# Patient Record
Sex: FEMALE | Race: BLACK OR AFRICAN AMERICAN | Employment: OTHER | URBAN - METROPOLITAN AREA
[De-identification: names, ages, dates, MRNs, and addresses within clinical notes are randomized per-mention and may not be internally consistent; named-entity substitution may affect disease eponyms.]

---

## 2017-04-25 ENCOUNTER — HOSPITAL ENCOUNTER (EMERGENCY)
Age: 72
Discharge: HOME | End: 2017-04-25
Attending: EMERGENCY MEDICINE
Payer: MEDICARE

## 2017-04-25 VITALS
TEMPERATURE: 99.3 F | BODY MASS INDEX: 22.05 KG/M2 | HEART RATE: 66 BPM | WEIGHT: 145 LBS | SYSTOLIC BLOOD PRESSURE: 144 MMHG | DIASTOLIC BLOOD PRESSURE: 64 MMHG | OXYGEN SATURATION: 98 % | RESPIRATION RATE: 16 BRPM

## 2017-04-25 DIAGNOSIS — M79.605 BILATERAL LEG PAIN: Primary | ICD-10-CM

## 2017-04-25 DIAGNOSIS — M79.604 BILATERAL LEG PAIN: Primary | ICD-10-CM

## 2017-04-25 LAB — GLUCOSE BLD STRIP.AUTO-MCNC: 111 MG/DL (ref 70–110)

## 2017-04-25 PROCEDURE — 82962 GLUCOSE BLOOD TEST: CPT

## 2017-04-25 PROCEDURE — 99283 EMERGENCY DEPT VISIT LOW MDM: CPT

## 2017-04-26 NOTE — ED NOTES
Pt states she has been having BLE pain for the past three years, L>R, has been taking Tylenol but no relief. Denies any recent injury, uses walker at home. Bilateral pedal pulses present. Able to move toes. CRT<2sec. Family at bedside.

## 2017-04-26 NOTE — ED NOTES
I have reviewed discharge instructions with the patient. The patient verbalized understanding. Pt in stable condition left via wheelchair, accompanied by hospital staff and daughter.

## 2017-04-26 NOTE — ANCILLARY DISCHARGE INSTRUCTIONS
Paco Lu calling on behalf of her mother. She wants to get her into [facility]. She states that her mother was in [facility] for 3 days. Her mother cannot move without screaming and she works and is concerned for her safety. She has applied for Medicaid back in November but hasn't heard from them. She also applied for someone to come out to the home([facility])received approval letter but hasnt heard anything. She is looking for direction. Explained that she is doing the right things she just needs to make the appropriate follow up calls.

## 2017-04-26 NOTE — ED TRIAGE NOTES
\"I cant walk. Im having problems with my legs. \"  Pt states this has been going on for the past year.

## 2017-04-26 NOTE — ED PROVIDER NOTES
Patient is a 66 y.o. female presenting with leg pain. The history is provided by the patient and a relative. Leg Pain      Nanette Ray is a 66 y.o. female presents with c/o bilateral leg pain. Pt has had leg pain for over a year. Was in [facility] ED for the past three days. Was released this morning. Daughter was unaware of the conversation at [facility]. Review of the notes shows that Assisted Living was an option but the Pt does not have insurance that will pay for assisted living. Home Health was arranged. Daughter wants her to be in assisted living. Denies any changes in leg pain and weakness. Daughter brought her mother here hoping that we can admit her and arrange for assisted living. Past Medical History:   Diagnosis Date    Anemia [facility]     CAD (coronary artery disease)     Congestive heart failure, unspecified     Diabetes ([facility] [address]     Dyslipidemia     GERD (gastroesophageal reflux disease)     Glaucoma     HTN (hypertension)     Kidney failure     LVH (left ventricular hypertrophy)     Neuropathy        Past Surgical History:   Procedure Laterality Date    HX BREAST BIOPSY  03/24/2010    excision left breast biopsy    HX HYSTERECTOMY      HX OTHER SURGICAL  12/3/10    I&D w/Pulsavac & wound vac application, left heel    HX OTHER SURGICAL  12/6/10    Debridement, left heel w/reduction of inferior calcaneal spur, left heel    HX POLYPECTOMY           Family History:   Problem Relation Age of Onset    Hypertension Other     Heart Disease Other     Diabetes Other     Diabetes Other     Heart Disease Other     Hypertension Other        Social History     Social History    Marital status:      Spouse name: N/A    Number of children: N/A    Years of education: N/A     Occupational History    Not on file.      Social History Main Topics    Smoking status: Never Smoker    Smokeless tobacco: Never Used    Alcohol use No    Drug use: No    Sexual activity: Not on file Other Topics Concern    Not on file     Social History Narrative         ALLERGIES: Review of patient's allergies indicates no known allergies. Review of Systems  Constitutional:  Denies malaise, fever, chills. Head:  Denies injury. Face:  Denies injury or pain. ENMT:  Denies sore throat. Neck:  Denies injury or pain. Chest:  Denies injury. Cardiac:  Denies chest pain or palpitations. Respiratory:  Denies cough, wheezing, difficulty breathing, shortness of breath. GI/ABD:  Denies injury, pain, distention, nausea, vomiting, diarrhea. :  Denies injury, pain, dysuria or urgency. Back:  Denies injury or pain. Pelvis:  Denies injury or pain. Extremity/MS:  Leg pain weakness. Neuro:  Denies headache, LOC, dizziness, neurologic symptoms/deficits/paresthesias. Skin: Denies injury, rash, itching or skin changes. Vitals:    04/25/17 2115   BP: 145/68   Pulse: 66   Resp: 16   Temp: 99.3 °F (37.4 °C)   SpO2: 99%   Weight: 65.8 kg (145 lb)            Physical Exam   Nursing note and vitals reviewed. CONSTITUTIONAL: Alert, in no apparent distress; well-developed; Frail  HEAD:  Normocephalic, atraumatic. EYES: PERRL; EOM's intact. ENTM: Nose: No rhinorrhea; Throat: mucous membranes moist. Posterior pharynx-normal.  Neck:  No JVD, supple without lymphadenopathy. RESP: Chest clear, equal breath sounds. CV: S1 and S2 WNL; No murmurs, gallops or rubs. GI: Abdomen soft and non-tender. No masses or organomegaly. UPPER EXT:  Normal inspection. LOWER EXT: Bilateral legs with 1+pitting edema, generalized tenderness, 4/5 strength, good pedal pulse, <2 cap refill. NEURO: strength 5/5 and sym, sensation intact. SKIN: No rashes; Normal for age and stage. PSYCH:  Alert and oriented, normal affect.        MDM  Number of Diagnoses or Management Options  Bilateral leg pain:   Diagnosis management comments: IMPRESSION AND MEDICAL DECISION MAKING:  Based upon the patient's presentation with noted HPI and PE, along with the work up done in the emergency department, I believe that the patient has chronic leg pain and weakness from a previous stroke. Had long discussion with Pt and daughter. Told daughter that assisted living from [facility] was going to be arranged but since Pt does not have insurance that the family declined. Home Health has been arranged. Will give  number and have them contact them tomorrow. Will also have her contact her PCP for possible suggestions. Amount and/or Complexity of Data Reviewed  Clinical lab tests: ordered and reviewed  Review and summarize past medical records: yes      ED Course       Procedures    Vitals:  Patient Vitals for the past 12 hrs:   Temp Pulse Resp BP SpO2   04/25/17 2115 99.3 °F (37.4 °C) 66 16 145/68 99 %         Medications ordered:   Medications - No data to display      Lab findings:  Recent Results (from the past 12 hour(s))   GLUCOSE, POC    Collection Time: 04/25/17  9:57 PM   Result Value Ref Range    Glucose (POC) 111 (H) 70 - 110 mg/dL       EKG interpretation by ED Physician:      X-Ray, CT or other radiology findings or impressions:  No orders to display       Progress notes, Consult notes or additional Procedure notes:       Disposition:  Diagnosis: No diagnosis found. Disposition:   10:02 PM  Pt reevaluated at this time and is resting comfortably in NAD. Discussed results and findings, as well as, diagnosis and plan for discharge. Pt verbalizes understanding and agreement with plan. All questions addressed at this time. Follow-up Information     None           Patient's Medications   Start Taking    No medications on file   Continue Taking    BRIMONIDINE-TIMOLOL (COMBIGAN) 0.2-0.5 % DROP OPHTHALMIC SOLUTION    1 Drop every twelve (12) hours. BRINZOLAMIDE (AZOPT) 1 % OPHTHALMIC SUSPENSION    Administer 1 Drop to both eyes two (2) times a day.       FOLIC ACID/VITAMIN B COMP W-C (RENAL CAPS PO)    Take 1 Cap by mouth daily. GABAPENTIN (NEURONTIN) 400 MG CAPSULE    Take 400 mg by mouth. Take 400 mg every morning and 800 mg at bedtime    LISINOPRIL (PRINIVIL, ZESTRIL) 20 MG TABLET    Take 20 mg by mouth daily. ROSUVASTATIN (CRESTOR) 10 MG TABLET    Take 10 mg by mouth daily. SEVELAMER CARBONATE (RENVELA) 800 MG TAB TAB    Take 800 mg by mouth three (3) times daily.    These Medications have changed    No medications on file   Stop Taking    No medications on file

## 2017-05-14 ENCOUNTER — APPOINTMENT (OUTPATIENT)
Dept: GENERAL RADIOLOGY | Age: 72
DRG: 064 | End: 2017-05-14
Attending: PHYSICIAN ASSISTANT
Payer: MEDICARE

## 2017-05-14 ENCOUNTER — HOSPITAL ENCOUNTER (INPATIENT)
Age: 72
LOS: 5 days | Discharge: NURSING, REHAB OR LONG-TERM CARE | DRG: 064 | End: 2017-05-20
Attending: EMERGENCY MEDICINE | Admitting: FAMILY MEDICINE
Payer: MEDICARE

## 2017-05-14 ENCOUNTER — APPOINTMENT (OUTPATIENT)
Dept: CT IMAGING | Age: 72
DRG: 064 | End: 2017-05-14
Attending: PHYSICIAN ASSISTANT
Payer: MEDICARE

## 2017-05-14 DIAGNOSIS — G45.9 TRANSIENT CEREBRAL ISCHEMIA, UNSPECIFIED TYPE: Primary | ICD-10-CM

## 2017-05-14 LAB
ALBUMIN SERPL BCP-MCNC: 3.4 G/DL (ref 3.4–5)
ALBUMIN/GLOB SERPL: 0.9 {RATIO} (ref 0.8–1.7)
ALP SERPL-CCNC: 141 U/L (ref 45–117)
ALT SERPL-CCNC: 15 U/L (ref 13–56)
ANION GAP BLD CALC-SCNC: 10 MMOL/L (ref 3–18)
ASPIRIN TEST, ASPIRN: 634 ARU
AST SERPL W P-5'-P-CCNC: 26 U/L (ref 15–37)
BILIRUB SERPL-MCNC: 0.7 MG/DL (ref 0.2–1)
BUN SERPL-MCNC: 27 MG/DL (ref 7–18)
BUN/CREAT SERPL: 4 (ref 12–20)
CALCIUM SERPL-MCNC: 8.9 MG/DL (ref 8.5–10.1)
CHLORIDE SERPL-SCNC: 101 MMOL/L (ref 100–108)
CK MB CFR SERPL CALC: 2.1 % (ref 0–4)
CK MB SERPL-MCNC: 1.2 NG/ML (ref 5–25)
CK SERPL-CCNC: 56 U/L (ref 26–192)
CO2 SERPL-SCNC: 25 MMOL/L (ref 21–32)
CREAT SERPL-MCNC: 6.62 MG/DL (ref 0.6–1.3)
ERYTHROCYTE [DISTWIDTH] IN BLOOD BY AUTOMATED COUNT: 17.6 % (ref 11.6–14.5)
FIBRINOGEN PPP-MCNC: 435 MG/DL (ref 210–451)
GLOBULIN SER CALC-MCNC: 3.9 G/DL (ref 2–4)
GLUCOSE SERPL-MCNC: 126 MG/DL (ref 74–99)
HCT VFR BLD AUTO: 44 % (ref 35–45)
HGB BLD-MCNC: 14.5 G/DL (ref 12–16)
INR PPP: 1 (ref 0.8–1.2)
MCH RBC QN AUTO: 27.2 PG (ref 24–34)
MCHC RBC AUTO-ENTMCNC: 33 G/DL (ref 31–37)
MCV RBC AUTO: 82.6 FL (ref 74–97)
P2Y12 PLT RESPONSE,PPPR: 227 PRU (ref 194–418)
PLATELET # BLD AUTO: 150 K/UL (ref 135–420)
PMV BLD AUTO: 10.9 FL (ref 9.2–11.8)
POTASSIUM SERPL-SCNC: 5.4 MMOL/L (ref 3.5–5.5)
PROT SERPL-MCNC: 7.3 G/DL (ref 6.4–8.2)
PROTHROMBIN TIME: 12.7 SEC (ref 11.5–15.2)
RBC # BLD AUTO: 5.33 M/UL (ref 4.2–5.3)
SODIUM SERPL-SCNC: 136 MMOL/L (ref 136–145)
THROMBIN TIME: 17.8 SECS (ref 13.8–18.2)
TROPONIN I SERPL-MCNC: 0.02 NG/ML (ref 0–0.04)
WBC # BLD AUTO: 4.9 K/UL (ref 4.6–13.2)

## 2017-05-14 PROCEDURE — 85384 FIBRINOGEN ACTIVITY: CPT | Performed by: PHYSICIAN ASSISTANT

## 2017-05-14 PROCEDURE — 85610 PROTHROMBIN TIME: CPT | Performed by: PHYSICIAN ASSISTANT

## 2017-05-14 PROCEDURE — 82553 CREATINE MB FRACTION: CPT | Performed by: PHYSICIAN ASSISTANT

## 2017-05-14 PROCEDURE — 85027 COMPLETE CBC AUTOMATED: CPT | Performed by: PHYSICIAN ASSISTANT

## 2017-05-14 PROCEDURE — 71010 XR CHEST PORT: CPT

## 2017-05-14 PROCEDURE — 99285 EMERGENCY DEPT VISIT HI MDM: CPT

## 2017-05-14 PROCEDURE — 85576 BLOOD PLATELET AGGREGATION: CPT | Performed by: PHYSICIAN ASSISTANT

## 2017-05-14 PROCEDURE — 70450 CT HEAD/BRAIN W/O DYE: CPT

## 2017-05-14 PROCEDURE — 94762 N-INVAS EAR/PLS OXIMTRY CONT: CPT

## 2017-05-14 PROCEDURE — 85670 THROMBIN TIME PLASMA: CPT | Performed by: PHYSICIAN ASSISTANT

## 2017-05-14 PROCEDURE — 80053 COMPREHEN METABOLIC PANEL: CPT | Performed by: PHYSICIAN ASSISTANT

## 2017-05-14 PROCEDURE — 86900 BLOOD TYPING SEROLOGIC ABO: CPT | Performed by: PHYSICIAN ASSISTANT

## 2017-05-14 NOTE — IP AVS SNAPSHOT
Current Discharge Medication List  
  
START taking these medications Dose & Instructions Dispensing Information Comments Morning Noon Evening Bedtime  
 aspirin 325 mg tablet Commonly known as:  ASPIRIN Your last dose was: Your next dose is:    
   
   
 Dose:  325 mg Take 1 Tab by mouth daily. Quantity:  100 Tab Refills:  5  
     
   
   
   
  
 atorvastatin 80 mg tablet Commonly known as:  LIPITOR Your last dose was: Your next dose is:    
   
   
 Dose:  80 mg Take 1 Tab by mouth nightly. Quantity:  30 Tab Refills:  5  
     
   
   
   
  
 b complex-vitamin c-folic acid 0.8 mg 0.8 mg Tab tablet Commonly known as:  Dadarrion Gomezh Your last dose was: Your next dose is:    
   
   
 Dose:  1 Tab Take 1 Tab by mouth daily. Quantity:  30 Tab Refills:  5  
     
   
   
   
  
 lidocaine 5 % Commonly known as:  Annamary Manual Your last dose was: Your next dose is:    
   
   
 Apply patch to the affected area for 12 hours a day and remove for 12 hours a day. Quantity:  1 Each Refills:  0  
     
   
   
   
  
 senna-docusate 8.6-50 mg per tablet Commonly known as:  Areatha Dach Your last dose was: Your next dose is:    
   
   
 Dose:  2 Tab Take 2 Tabs by mouth nightly. Quantity:  60 Tab Refills:  5 CONTINUE these medications which have NOT CHANGED Dose & Instructions Dispensing Information Comments Morning Noon Evening Bedtime COMBIGAN 0.2-0.5 % Drop ophthalmic solution Generic drug:  brimonidine-timolol Your last dose was: Your next dose is:    
   
   
 Dose:  1 Drop 1 Drop every twelve (12) hours. Refills:  0  
     
   
   
   
  
 gabapentin 400 mg capsule Commonly known as:  NEURONTIN Your last dose was:     
   
Your next dose is:    
   
   
 Dose:  400 mg  
 Take 400 mg by mouth. Take 400 mg every morning and 800 mg at bedtime Refills:  0 RENAL CAPS PO Your last dose was: Your next dose is:    
   
   
 Dose:  1 Cap Take 1 Cap by mouth daily. Refills:  0  
     
   
   
   
  
 RENVELA 800 mg Tab tab Generic drug:  sevelamer carbonate Your last dose was: Your next dose is:    
   
   
 Dose:  800 mg Take 800 mg by mouth three (3) times daily. Refills:  0 STOP taking these medications AZOPT 1 % ophthalmic suspension Generic drug:  brinzolamide CRESTOR 10 mg tablet Generic drug:  rosuvastatin  
   
  
 lisinopril 20 mg tablet Commonly known as:  Mariana Gear Where to Get Your Medications Information on where to get these meds will be given to you by the nurse or doctor. ! Ask your nurse or doctor about these medications  
  aspirin 325 mg tablet  
 atorvastatin 80 mg tablet  
 b complex-vitamin c-folic acid 0.8 mg 0.8 mg Tab tablet  
 lidocaine 5 %  
 senna-docusate 8.6-50 mg per tablet

## 2017-05-14 NOTE — IP AVS SNAPSHOT
Belkis Dickson 
 
 
 [address] [place] [address] 
[phone] Patient: Aicha Emerson MRN: [id number] GEY:0[birth year]2/[birth year]73 You are allergic to the following No active allergies Recent Documentation Weight BMI OB Status Smoking Status 59.5 kg 20.55 kg/m2 Hysterectomy Never Smoker Emergency Contacts Name Discharge Info Relation Home Work Mobile Eboni Dietrich  Daughter [21] [phone] About your hospitalization You were admitted on:  May 15, 2017 You last received care in the:  [address] [facility] You were discharged on:  May 20, 2017 Unit phone number:  [phone] Why you were hospitalized Your primary diagnosis was:  Acute Ischemic Stroke (Hcc) Your diagnoses also included:  Encephalopathy Providers Seen During Your Hospitalizations Provider Role Specialty Primary office phone Luis M Duarte MD Attending Provider Emergency Medicine [phone] Abby Spivey MD Attending Provider [facility] [phone] Marco A Morales MD Attending Provider Internal Medicine [phone] Your Primary Care Physician (PCP) Primary Care Physician Office Phone Office Fax Shereen Newell [phone] Follow-up Information Follow up With Details Comments Contact Info [facility]  for rehab [address] [place] [address] [phone] Yadi Mitchell MD Schedule an appointment as soon as possible for a visit in 1 week for follow up one week after discharge from [address] [place] [address] 
[phone] Current Discharge Medication List  
  
START taking these medications Dose & Instructions Dispensing Information Comments Morning Noon Evening Bedtime  
 aspirin 325 mg tablet Commonly known as:  ASPIRIN Your last dose was: Your next dose is:    
   
   
 Dose:  325 mg Take 1 Tab by mouth daily. Quantity:  100 Tab Refills:  5  
     
   
   
   
  
 atorvastatin 80 mg tablet Commonly known as:  LIPITOR Your last dose was: Your next dose is:    
   
   
 Dose:  80 mg Take 1 Tab by mouth nightly. Quantity:  30 Tab Refills:  5  
     
   
   
   
  
 b complex-vitamin c-folic acid 0.8 mg 0.8 mg Tab tablet Commonly known as:  Marsen Jasbir Your last dose was: Your next dose is:    
   
   
 Dose:  1 Tab Take 1 Tab by mouth daily. Quantity:  30 Tab Refills:  5  
     
   
   
   
  
 lidocaine 5 % Commonly known as:  Eloise Wickred Your last dose was: Your next dose is:    
   
   
 Apply patch to the affected area for 12 hours a day and remove for 12 hours a day. Quantity:  1 Each Refills:  0  
     
   
   
   
  
 senna-docusate 8.6-50 mg per tablet Commonly known as:  Tosha Amador Your last dose was: Your next dose is:    
   
   
 Dose:  2 Tab Take 2 Tabs by mouth nightly. Quantity:  60 Tab Refills:  5 CONTINUE these medications which have NOT CHANGED Dose & Instructions Dispensing Information Comments Morning Noon Evening Bedtime COMBIGAN 0.2-0.5 % Drop ophthalmic solution Generic drug:  brimonidine-timolol Your last dose was: Your next dose is:    
   
   
 Dose:  1 Drop 1 Drop every twelve (12) hours. Refills:  0  
     
   
   
   
  
 gabapentin 400 mg capsule Commonly known as:  NEURONTIN Your last dose was: Your next dose is:    
   
   
 Dose:  400 mg Take 400 mg by mouth. Take 400 mg every morning and 800 mg at bedtime Refills:  0 RENAL CAPS PO Your last dose was: Your next dose is:    
   
   
 Dose:  1 Cap Take 1 Cap by mouth daily. Refills:  0  
     
   
   
   
  
 RENVELA 800 mg Tab tab Generic drug:  sevelamer carbonate Your last dose was: Your next dose is:    
   
   
 Dose:  800 mg Take 800 mg by mouth three (3) times daily. Refills:  0 STOP taking these medications AZOPT 1 % ophthalmic suspension Generic drug:  brinzolamide CRESTOR 10 mg tablet Generic drug:  rosuvastatin  
   
  
 lisinopril 20 mg tablet Commonly known as:  Bean  Where to Get Your Medications Information on where to get these meds will be given to you by the nurse or doctor. ! Ask your nurse or doctor about these medications  
  aspirin 325 mg tablet  
 atorvastatin 80 mg tablet  
 b complex-vitamin c-folic acid 0.8 mg 0.8 mg Tab tablet  
 lidocaine 5 %  
 senna-docusate 8.6-50 mg per tablet Discharge Instructions DISCHARGE SUMMARY from Nurse The following personal items are in your possession at time of discharge: 
 
Dental Appliances: With patient Visual Aid: None Home Medications: None Jewelry: None Clothing: Nicole Camps, Footwear Other Valuables: None PATIENT INSTRUCTIONS: 
 
 
F-face looks uneven A-arms unable to move or move unevenly S-speech slurred or non-existent T-time-call 911 as soon as signs and symptoms begin-DO NOT go Back to bed or wait to see if you get better-TIME IS BRAIN. Warning Signs of HEART ATTACK Call 911 if you have these symptoms: 
? Chest discomfort. Most heart attacks involve discomfort in the center of the chest that lasts more than a few minutes, or that goes away and comes back. It can feel like uncomfortable pressure, squeezing, fullness, or pain. ? Discomfort in other areas of the upper body. Symptoms can include pain or discomfort in one or both arms, the back, neck, jaw, or stomach. ? Shortness of breath with or without chest discomfort. ? Other signs may include breaking out in a cold sweat, nausea, or lightheadedness. Don't wait more than five minutes to call [address]! Fast action can save your life. Calling 911 is almost always the fastest way to get lifesaving treatment. Emergency Medical Services staff can begin treatment when they arrive  up to an hour sooner than if someone gets to the hospital by car. The discharge information has been reviewed with the patient and caregiver. The patient and caregiver verbalized understanding. Discharge medications reviewed with the patient and caregiver and appropriate educational materials and side effects teaching were provided. Learning About an Ischemic Stroke What is an ischemic stroke? An ischemic (say \"aek-EWN-ldld\") stroke occurs when a blood clot blocks a blood vessel in the brain. This means that blood cannot flow to some part of the brain. Without blood and the oxygen it carries, this part of the brain starts to die. The part of the body controlled by the damaged area of the brain cannot work properly. This is different from a hemorrhagic (say \"mzr-afu-EI-jick\") stroke, which happens when a blood vessel in the brain has burst open or has started to leak. The brain damage from a stroke starts within minutes. Quick treatment can help limit damage to the brain and make recovery more likely. People who have had a stroke may have a hard time talking, understanding things, and making decisions. They may have to relearn daily activities, such as how to eat, bathe, and dress. How well someone recovers from a stroke depends on how quickly the person gets to the hospital, where in the brain the stroke happened, and how severe it was.  Training and therapy also make a difference in how well people recover. What are the symptoms? If you have any of these symptoms, call 911 or other emergency services right away: 
· You have symptoms of a stroke. These may include: 
¨ Sudden numbness, tingling, weakness, or loss of movement in your face, arm, or leg, especially on only one side of your body. ¨ Sudden vision changes. ¨ Sudden trouble speaking. ¨ Sudden confusion or trouble understanding simple statements. ¨ Sudden problems with walking or balance. ¨ A sudden, severe headache that is different from past headaches. See your doctor if you have symptoms that seem like a stroke, even if they go away quickly. You may have had a transient ischemic attack (TIA), sometimes called a mini-stroke. A TIA is a warning that a stroke may happen soon. Getting early treatment for a TIA can help prevent a stroke. What causes an ischemic stroke? An ischemic stroke is caused by a blood clot that blocks blood flow in the brain. The most common causes of blood clots include: 
· Hardening of the arteries (atherosclerosis). This is caused by high blood pressure, diabetes, high cholesterol, or smoking. · Atrial fibrillation. How is ischemic stroke treated? You may have to take several medicines, depending on what caused your stroke. Ask your doctor if a stroke rehab program is right for you. Rehab increases your chances of getting back some of the abilities you lost. 
Follow-up care is a key part of your treatment and safety. Be sure to make and go to all appointments, and call your doctor if you are having problems. It's also a good idea to know your test results and keep a list of the medicines you take. How can you prevent another stroke? · Work with your doctor to treat any health problems you have. High blood pressure, high cholesterol, atrial fibrillation, and diabetes all raise your chances of having a stroke. · Be safe with medicines. Take your medicine exactly as prescribed. Call your doctor if you think you are having a problem with your medicine. · Have a healthy lifestyle. ¨ Do not smoke or allow others to smoke around you. If you need help quitting, talk to your doctor about stop-smoking programs and medicines. These can increase your chances of quitting for good. Smoking makes a stroke more likely. ¨ Limit alcohol to 2 drinks a day for men and 1 drink a day for women. ¨ Lose weight if you need to. A healthy weight will help you keep your heart and body healthy. ¨ Be active. Ask your doctor what type and level of activity is safe for you. ¨ Eat heart-healthy foods, like fruits, vegetables, and high-fiber foods. Where can you learn more? Go to http://[employer].george.info/. Enter D161 in the search box to learn more about \"Learning About an Ischemic Stroke. \" Current as of: June 4, 2016 Content Version: 11.2 © 5992-7651 [employer]. Care instructions adapted under license by [employer] (which disclaims liability or warranty for this information). If you have questions about a medical condition or this instruction, always ask your healthcare professional. [place] [address] any warranty or liability for your use of this information. Learning About Emotional Changes After a Stroke Introduction After a stroke, many people feel different without knowing why. For example, some people find it hard to control their emotions. They may cry or laugh for no reason. Or they may feel down or even hopeless. Some people may find they're acting differently. They may act too quickly or on impulse. Or they may be more anxious and hesitant at times. If these changes happen to you, they can be upsetting. And they can be confusing to you and your loved ones. But these changes may get better with time as your brain heals. Let your loved ones know what's happening. Their support and understanding can help you deal with these feelings. And with time and support from the people around you, you can learn ways to adjust to life after a stroke. Follow-up care is a key part of your treatment and safety. Be sure to make and go to all appointments, and call your doctor if you are having problems. It's also a good idea to know your test results and keep a list of the medicines you take. How can a stroke affect your emotions? After a stroke, some people feel like they have lost control of their emotions. These feelings can come from one or both of these two causes: · A stroke can affect parts of the brain that control how you feel. · A stroke can leave you with upsetting body changes that take away some of your independence. For example, some people may feel: · Sad or angry about the loss of the lifestyle they had before. · Isolated by speech and language problems. · Frustrated by the slow pace of recovery. · Worried about the future. These feelings are normal and expected. These strong feelings are more likely to happen if you need to stay in bed for long periods of time. And it is more likely to be a problem at night. It may help to have a radio playing softly in the bedroom or a dim light beside the bed. How can you deal with your emotions after a stroke? To deal with your emotions: 
· Be easy on yourself. Let go of mistakes. · Give yourself credit for the progress you have made. · Make time for things that you enjoy. · Join a stroke support group. Your rehab team or local hospital can help you find one. Is depression common? It is common to feel sad about changes caused by the stroke. Sometimes the injury to the brain from the stroke can cause depression. If you think you might be depressed, tell your doctor right away. The sooner you know if you are depressed, the sooner you can get treatment. Treatment can help you feel better. Your doctor will want to know if in the past 2 weeks: 
· You have lost interest or pleasure in doing things. · You have been feeling sad, hopeless, or empty. Your doctor may also ask about sleep troubles or changes in eating. How can friends and family help? Your loved ones can help you by following these tips: · A person who has had a stroke may tend to have strong emotional reactions. Remember that these are a result of the stroke. Try not to become too upset by them. · Don't avoid a loved one who's had a stroke. Contact with and support from family members is very important to recovery. · Watch for signs of depression in people who have had a stroke. Urge them to talk to their doctor if they think they might be depressed. Where can you learn more? Go to http://chandana-george.info/. Enter 427 9840 in the search box to learn more about \"Learning About Emotional Changes After a Stroke. \" Current as of: July 20, 2016 Content Version: 11.2 © 8117-5510 [place]. Care instructions adapted under license by [employer] (which disclaims liability or warranty for this information). If you have questions about a medical condition or this instruction, always ask your healthcare professional. [address] any warranty or liability for your use of this information. Discharge Orders None [facility] Announcement We are excited to announce that we are making your provider's discharge notes available to you in [employer]Wak. You will see these notes when they are completed and signed by the physician that discharged you from your recent hospital stay. If you have any questions or concerns about any information you see in [employer]ak, please call the Health Information Department where you were seen or reach out to your Primary Care Provider for more information about your plan of care. Introducing [facility]! Marilynn Carl introduces [employer] patient portal. Now you can access parts of your medical record, email your doctor's office, and request medication refills online. 1. In your internet browser, go to [email or web address] [email or web address] 2. Click on the First Time User? Click Here link in the Sign In box. You will see the New Member Sign Up page. 3. Enter your [employer] Access Code exactly as it appears below. You will not need to use this code after youve completed the sign-up process. If you do not sign up before the expiration date, you must request a new code. · [employer] Access Code: [email or web address] Expires: 7/24/2017  9:23 PM 
 
4. Enter the last four digits of your Social Security Number (xxxx) and Date of Birth (mm/dd/yyyy) as indicated and click Submit. You will be taken to the next sign-up page. 5. Create a [employer] ID. This will be your [employer] login ID and cannot be changed, so think of one that is secure and easy to remember. 6. Create a [employer] password. You can change your password at any time. 7. Enter your Password Reset Question and Answer. This can be used at a later time if you forget your password. 8. Enter your e-mail address. You will receive e-mail notification when new information is available in [address] 9. Click Sign Up. You can now view and download portions of your medical record. 10. Click the Download Summary menu link to download a portable copy of your medical information. If you have questions, please visit the Frequently Asked Questions section of the [employer] website. Remember, [employer] is NOT to be used for urgent needs. For medical emergencies, dial 911. Now available from your iPhone and Android! General Information Please provide this summary of care documentation to your next provider. Patient Signature:  ____________________________________________________________ Date:  ____________________________________________________________  
  
Leslye Parisian Provider Signature:  ____________________________________________________________ Date:  ____________________________________________________________

## 2017-05-15 ENCOUNTER — APPOINTMENT (OUTPATIENT)
Dept: MRI IMAGING | Age: 72
DRG: 064 | End: 2017-05-15
Attending: FAMILY MEDICINE
Payer: MEDICARE

## 2017-05-15 ENCOUNTER — APPOINTMENT (OUTPATIENT)
Dept: MRI IMAGING | Age: 72
DRG: 064 | End: 2017-05-15
Attending: NURSE PRACTITIONER
Payer: MEDICARE

## 2017-05-15 PROBLEM — G93.40 ENCEPHALOPATHY: Status: ACTIVE | Noted: 2017-05-15

## 2017-05-15 LAB
ABO + RH BLD: NORMAL
BLOOD GROUP ANTIBODIES SERPL: NORMAL
CHOLEST SERPL-MCNC: 98 MG/DL
ERYTHROCYTE [SEDIMENTATION RATE] IN BLOOD: 17 MM/HR (ref 0–30)
EST. AVERAGE GLUCOSE BLD GHB EST-MCNC: 123 MG/DL
GLUCOSE BLD STRIP.AUTO-MCNC: 71 MG/DL (ref 70–110)
HBA1C MFR BLD: 5.9 % (ref 4.2–5.6)
HDLC SERPL-MCNC: 60 MG/DL (ref 40–60)
HDLC SERPL: 1.6 {RATIO} (ref 0–5)
LDLC SERPL CALC-MCNC: 26.4 MG/DL (ref 0–100)
LIPID PROFILE,FLP: NORMAL
SPECIMEN EXP DATE BLD: NORMAL
T3FREE SERPL-MCNC: 2.1 PG/ML (ref 2.3–4.2)
T4 FREE SERPL-MCNC: 0.9 NG/DL (ref 0.7–1.5)
TRIGL SERPL-MCNC: 58 MG/DL (ref ?–150)
TSH SERPL DL<=0.05 MIU/L-ACNC: 0.85 UIU/ML (ref 0.36–3.74)
VLDLC SERPL CALC-MCNC: 11.6 MG/DL

## 2017-05-15 PROCEDURE — 92610 EVALUATE SWALLOWING FUNCTION: CPT

## 2017-05-15 PROCEDURE — 85652 RBC SED RATE AUTOMATED: CPT | Performed by: FAMILY MEDICINE

## 2017-05-15 PROCEDURE — 70544 MR ANGIOGRAPHY HEAD W/O DYE: CPT

## 2017-05-15 PROCEDURE — 80061 LIPID PANEL: CPT | Performed by: FAMILY MEDICINE

## 2017-05-15 PROCEDURE — 83036 HEMOGLOBIN GLYCOSYLATED A1C: CPT | Performed by: FAMILY MEDICINE

## 2017-05-15 PROCEDURE — 77030020245 HC SOL INJ 5% D/0.9%NACL

## 2017-05-15 PROCEDURE — 84481 FREE ASSAY (FT-3): CPT | Performed by: FAMILY MEDICINE

## 2017-05-15 PROCEDURE — C9113 INJ PANTOPRAZOLE SODIUM, VIA: HCPCS | Performed by: FAMILY MEDICINE

## 2017-05-15 PROCEDURE — 82962 GLUCOSE BLOOD TEST: CPT

## 2017-05-15 PROCEDURE — 86141 C-REACTIVE PROTEIN HS: CPT | Performed by: FAMILY MEDICINE

## 2017-05-15 PROCEDURE — 84443 ASSAY THYROID STIM HORMONE: CPT | Performed by: FAMILY MEDICINE

## 2017-05-15 PROCEDURE — G8987 SELF CARE CURRENT STATUS: HCPCS

## 2017-05-15 PROCEDURE — 97166 OT EVAL MOD COMPLEX 45 MIN: CPT

## 2017-05-15 PROCEDURE — 70547 MR ANGIOGRAPHY NECK W/O DYE: CPT

## 2017-05-15 PROCEDURE — 84439 ASSAY OF FREE THYROXINE: CPT | Performed by: FAMILY MEDICINE

## 2017-05-15 PROCEDURE — A6213 FOAM DRG >16<=48 SQ IN W/BDR: HCPCS

## 2017-05-15 PROCEDURE — 74011250637 HC RX REV CODE- 250/637: Performed by: FAMILY MEDICINE

## 2017-05-15 PROCEDURE — G8996 SWALLOW CURRENT STATUS: HCPCS

## 2017-05-15 PROCEDURE — 74011000250 HC RX REV CODE- 250: Performed by: HOSPITALIST

## 2017-05-15 PROCEDURE — G8988 SELF CARE GOAL STATUS: HCPCS

## 2017-05-15 PROCEDURE — 74011000258 HC RX REV CODE- 258: Performed by: FAMILY MEDICINE

## 2017-05-15 PROCEDURE — 65660000000 HC RM CCU STEPDOWN

## 2017-05-15 PROCEDURE — 74011000250 HC RX REV CODE- 250: Performed by: FAMILY MEDICINE

## 2017-05-15 PROCEDURE — 90935 HEMODIALYSIS ONE EVALUATION: CPT

## 2017-05-15 PROCEDURE — 93306 TTE W/DOPPLER COMPLETE: CPT

## 2017-05-15 PROCEDURE — 36415 COLL VENOUS BLD VENIPUNCTURE: CPT | Performed by: FAMILY MEDICINE

## 2017-05-15 PROCEDURE — 93005 ELECTROCARDIOGRAM TRACING: CPT

## 2017-05-15 PROCEDURE — 74011250636 HC RX REV CODE- 250/636: Performed by: FAMILY MEDICINE

## 2017-05-15 PROCEDURE — 70551 MRI BRAIN STEM W/O DYE: CPT

## 2017-05-15 PROCEDURE — G8997 SWALLOW GOAL STATUS: HCPCS

## 2017-05-15 PROCEDURE — 77030032490 HC SLV COMPR SCD KNE COVD -B

## 2017-05-15 PROCEDURE — 74011250637 HC RX REV CODE- 250/637: Performed by: PHYSICIAN ASSISTANT

## 2017-05-15 PROCEDURE — 5A1D60Z PERFORMANCE OF URINARY FILTRATION, MULTIPLE: ICD-10-PCS | Performed by: INTERNAL MEDICINE

## 2017-05-15 RX ORDER — BRINZOLAMIDE 10 MG/ML
1 SUSPENSION/ DROPS OPHTHALMIC 2 TIMES DAILY
Status: DISCONTINUED | OUTPATIENT
Start: 2017-05-15 | End: 2017-05-20 | Stop reason: HOSPADM

## 2017-05-15 RX ORDER — ACETAMINOPHEN 325 MG/1
650 TABLET ORAL
Status: DISCONTINUED | OUTPATIENT
Start: 2017-05-15 | End: 2017-05-20 | Stop reason: HOSPADM

## 2017-05-15 RX ORDER — SEVELAMER CARBONATE 800 MG/1
800 TABLET, FILM COATED ORAL 3 TIMES DAILY
Status: DISCONTINUED | OUTPATIENT
Start: 2017-05-15 | End: 2017-05-20 | Stop reason: HOSPADM

## 2017-05-15 RX ORDER — GABAPENTIN 400 MG/1
400 CAPSULE ORAL 3 TIMES DAILY
Status: DISCONTINUED | OUTPATIENT
Start: 2017-05-15 | End: 2017-05-15

## 2017-05-15 RX ORDER — DEXTROSE MONOHYDRATE AND SODIUM CHLORIDE 5; .9 G/100ML; G/100ML
50 INJECTION, SOLUTION INTRAVENOUS CONTINUOUS
Status: DISCONTINUED | OUTPATIENT
Start: 2017-05-15 | End: 2017-05-15

## 2017-05-15 RX ORDER — ASPIRIN 325 MG
325 TABLET ORAL
Status: COMPLETED | OUTPATIENT
Start: 2017-05-15 | End: 2017-05-15

## 2017-05-15 RX ORDER — ALBUMIN HUMAN 250 G/1000ML
12.5 SOLUTION INTRAVENOUS
Status: DISCONTINUED | OUTPATIENT
Start: 2017-05-15 | End: 2017-05-20 | Stop reason: HOSPADM

## 2017-05-15 RX ORDER — ENOXAPARIN SODIUM 100 MG/ML
30 INJECTION SUBCUTANEOUS EVERY 24 HOURS
Status: DISCONTINUED | OUTPATIENT
Start: 2017-05-16 | End: 2017-05-20 | Stop reason: HOSPADM

## 2017-05-15 RX ORDER — SODIUM CHLORIDE 9 MG/ML
50 INJECTION, SOLUTION INTRAVENOUS
Status: DISCONTINUED | OUTPATIENT
Start: 2017-05-15 | End: 2017-05-20 | Stop reason: HOSPADM

## 2017-05-15 RX ORDER — TIMOLOL MALEATE 5 MG/ML
1 SOLUTION/ DROPS OPHTHALMIC 2 TIMES DAILY
Status: DISCONTINUED | OUTPATIENT
Start: 2017-05-15 | End: 2017-05-20 | Stop reason: HOSPADM

## 2017-05-15 RX ORDER — ENOXAPARIN SODIUM 100 MG/ML
40 INJECTION SUBCUTANEOUS EVERY 24 HOURS
Status: DISCONTINUED | OUTPATIENT
Start: 2017-05-15 | End: 2017-05-15

## 2017-05-15 RX ORDER — BRIMONIDINE TARTRATE 2 MG/ML
1 SOLUTION/ DROPS OPHTHALMIC 2 TIMES DAILY
Status: DISCONTINUED | OUTPATIENT
Start: 2017-05-15 | End: 2017-05-20 | Stop reason: HOSPADM

## 2017-05-15 RX ORDER — ACETAMINOPHEN 500 MG
1000 TABLET ORAL
Status: COMPLETED | OUTPATIENT
Start: 2017-05-15 | End: 2017-05-15

## 2017-05-15 RX ORDER — ACETAMINOPHEN 650 MG/1
650 SUPPOSITORY RECTAL
Status: DISCONTINUED | OUTPATIENT
Start: 2017-05-15 | End: 2017-05-20 | Stop reason: HOSPADM

## 2017-05-15 RX ORDER — AMOXICILLIN 250 MG
2 CAPSULE ORAL
Status: DISCONTINUED | OUTPATIENT
Start: 2017-05-15 | End: 2017-05-20 | Stop reason: HOSPADM

## 2017-05-15 RX ORDER — HEPARIN SODIUM 5000 [USP'U]/ML
1000 INJECTION, SOLUTION INTRAVENOUS; SUBCUTANEOUS
Status: ACTIVE | OUTPATIENT
Start: 2017-05-15 | End: 2017-05-15

## 2017-05-15 RX ORDER — ASPIRIN 325 MG
325 TABLET ORAL DAILY
Status: DISCONTINUED | OUTPATIENT
Start: 2017-05-15 | End: 2017-05-20 | Stop reason: HOSPADM

## 2017-05-15 RX ORDER — ONDANSETRON 2 MG/ML
1 INJECTION INTRAMUSCULAR; INTRAVENOUS
Status: DISCONTINUED | OUTPATIENT
Start: 2017-05-15 | End: 2017-05-20 | Stop reason: HOSPADM

## 2017-05-15 RX ORDER — GABAPENTIN 400 MG/1
400 CAPSULE ORAL DAILY
Status: DISCONTINUED | OUTPATIENT
Start: 2017-05-16 | End: 2017-05-20 | Stop reason: HOSPADM

## 2017-05-15 RX ORDER — ATORVASTATIN CALCIUM 40 MG/1
80 TABLET, FILM COATED ORAL
Status: DISCONTINUED | OUTPATIENT
Start: 2017-05-15 | End: 2017-05-20 | Stop reason: HOSPADM

## 2017-05-15 RX ORDER — ALBUTEROL SULFATE 0.83 MG/ML
2.5 SOLUTION RESPIRATORY (INHALATION)
Status: DISCONTINUED | OUTPATIENT
Start: 2017-05-15 | End: 2017-05-20 | Stop reason: HOSPADM

## 2017-05-15 RX ADMIN — TIMOLOL MALEATE 1 DROP: 5 SOLUTION OPHTHALMIC at 17:11

## 2017-05-15 RX ADMIN — ASPIRIN 325 MG ORAL TABLET 325 MG: 325 PILL ORAL at 10:14

## 2017-05-15 RX ADMIN — SEVELAMER CARBONATE 800 MG: 800 TABLET, FILM COATED ORAL at 21:46

## 2017-05-15 RX ADMIN — ENOXAPARIN SODIUM 40 MG: 40 INJECTION SUBCUTANEOUS at 06:00

## 2017-05-15 RX ADMIN — SEVELAMER CARBONATE 800 MG: 800 TABLET, FILM COATED ORAL at 16:50

## 2017-05-15 RX ADMIN — ASCORBIC ACID, FOLIC ACID, NIACIN, THIAMINE, RIBOFLAVIN, PYRIDOXINE, CYANOCOBALAMIN, PANTOTHENIC ACID, BIOTIN 1 CAPSULE: 100; 150; 6; 1; 20; 5; 10; 1.7; 1.5 CAPSULE, LIQUID FILLED ORAL at 09:00

## 2017-05-15 RX ADMIN — PRAVASTATIN SODIUM 2 TABLET: 20 TABLET ORAL at 21:45

## 2017-05-15 RX ADMIN — DEXTROSE MONOHYDRATE AND SODIUM CHLORIDE 50 ML/HR: 5; .9 INJECTION, SOLUTION INTRAVENOUS at 06:00

## 2017-05-15 RX ADMIN — ASPIRIN 325 MG ORAL TABLET 325 MG: 325 PILL ORAL at 00:28

## 2017-05-15 RX ADMIN — SEVELAMER CARBONATE 800 MG: 800 TABLET, FILM COATED ORAL at 10:13

## 2017-05-15 RX ADMIN — SODIUM CHLORIDE 40 MG: 9 INJECTION INTRAMUSCULAR; INTRAVENOUS; SUBCUTANEOUS at 10:14

## 2017-05-15 RX ADMIN — FOLIC ACID: 5 INJECTION, SOLUTION INTRAMUSCULAR; INTRAVENOUS; SUBCUTANEOUS at 17:10

## 2017-05-15 RX ADMIN — ACETAMINOPHEN 1000 MG: 500 TABLET ORAL at 00:28

## 2017-05-15 RX ADMIN — GABAPENTIN 400 MG: 400 CAPSULE ORAL at 16:50

## 2017-05-15 RX ADMIN — GABAPENTIN 400 MG: 400 CAPSULE ORAL at 10:14

## 2017-05-15 RX ADMIN — SODIUM CHLORIDE 40 MG: 9 INJECTION INTRAMUSCULAR; INTRAVENOUS; SUBCUTANEOUS at 21:45

## 2017-05-15 RX ADMIN — BRINZOLAMIDE 1 DROP: 10 SUSPENSION/ DROPS OPHTHALMIC at 17:07

## 2017-05-15 RX ADMIN — ATORVASTATIN CALCIUM 80 MG: 40 TABLET, FILM COATED ORAL at 21:45

## 2017-05-15 RX ADMIN — BRIMONIDINE TARTRATE 1 DROP: 2 SOLUTION/ DROPS OPHTHALMIC at 17:07

## 2017-05-15 RX ADMIN — BRINZOLAMIDE 1 DROP: 10 SUSPENSION/ DROPS OPHTHALMIC at 10:16

## 2017-05-15 NOTE — PROGRESS NOTES
RECOMMENDATION TO DISCONTINUE PROTON PUMP INHIBITOR    The patient was ordered a PPI for the following indication:  stress ulcer prophylaxis    The patient is no longer in the ED and is on the following Diet: dental soft    The patients profile has been reviewed and no documentation of the following were found:  Heartburn/symptomatic GERD, Gastritis, GI bleed, Peptic Ulcer Disease (Gastric or Duodenal Ulcers), Proximal GI fistula, erosive esophagitis or Snyders esophagitis, hypersecretory conditions (eg, Zollinger-Garvin syndrome), H. pylori,  NSAID/corticosteroid prevention or treatment of ulcer. Please indicate reason for continuing the proton pump inhibitor. Thank you,  Chandana Christensen PharmUriel D.

## 2017-05-15 NOTE — ROUTINE PROCESS
0730 - assumed care of patient - received report from [address] [place] [address] - patient alert and oriented - speech is clear - no complaints at this time. 0900 - speech at bedside - patient may have dental soft  Diet - tray ordered. 1015 - AM meds given. Patient transported to MRI via bed. 1115 - back to room - Dr. Peyton Quintanilla at bedside. Patient to Dialysis later today. 1230 - sitting on side of bed for lunch    1500 - to echo via bed    1615 - back to room - visitors at bedside    1720 - patient transported via bed back to MRI for further studies. 1845 - back to room - visitors at bedside - still waiting for dialysis. .    2015 - Bedside and Verbal shift change report given to juwan zheng rn (oncoming nurse) by Ephraim Burkett rn (offgoing nurse).  Report included the following information SBAR and Kardex Mar.

## 2017-05-15 NOTE — PROGRESS NOTES
OT order received and chart reviewed. 1317: 1st attempt for OT evaluation; pt sitting at EOB eating lunch. Will follow up as schedule permits.     I appreciate the referral.    Pedro Luis Keller MS OTR/L  Office Ext: [phone]  Pager: [phone]

## 2017-05-15 NOTE — ACP (ADVANCE CARE PLANNING)
Patient has designated ___her daughter_____________________ to participate in his/her discharge plan and to receive any needed information. Name: Brendan Tobin  Address:  [address] [place] [address].   [facility] [address]  Phone number:  [phone]

## 2017-05-15 NOTE — PROGRESS NOTES
conducted an initial consultation and Spiritual Assessment for Maria D Cartagena, who is a 74 y.o.,female. Patients Primary Language is: [place]. According to the patients EMR Sabianism Affiliation is: Luis Enrique Ross. The reason the Patient came to the hospital is:   Patient Active Problem List    Diagnosis Date Noted    Encephalopathy 05/15/2017    ESRD on dialysis [facility]) 01/14/2016    Chest pain 01/14/2016    Abnormal EKG 01/14/2016    Fibrosis, breast 10/31/2012    S/P breast biopsy, left 09/15/2010    Diabetes ([facility] [address]     HTN (hypertension)     Glaucoma     Kidney failure         The  provided the following Interventions:  Initiated a relationship of care and support. Explored issues of ramiro, belief, spirituality and Confucianist/ritual needs while hospitalized. Listened empathically. Provided chaplaincy education. Provided information about Spiritual Care Services. Offered prayer and assurance of continued prayers on patient's behalf. Chart reviewed. The following outcomes were achieved:  Patient shared limited information about both their medical narrative and spiritual journey/beliefs. Patient processed feeling about current hospitalization. Patient expressed gratitude for the 's visit. Assessment:  Patient does not have any Confucianist/cultural needs that will affect patients preferences in health care. Patient did not indicate any spiritual or Confucianist issues which require Spiritual Care Services interventions at this time. Plan:  Chaplains will continue to follow and will provide pastoral care on an as needed/requested basis.  recommends bedside caregivers page  on duty if patient shows signs of acute spiritual or emotional distress.     [facility]   Staff [address] [facility]   [phone]

## 2017-05-15 NOTE — PROGRESS NOTES
Progress Note      Patient: Xochitl Thompson               Sex: female          DOA: 5/14/2017       YOB: 1945      Age:  74 y.o.        LOS:  LOS: 0 days               Subjective:   Help of neurology greatly appreciated . Pt is s/p acute cva in the left insular cortex  The MRA of the hea was ordered by neurology to look to see if there is high grade stenosis of the  Left sylvian fissure MCA  branch .  Pt 's     Speech and facia; droop have returned to her baseline     Objective:      Visit Vitals    /67 (BP 1 Location: Left leg, BP Patient Position: At rest)    Pulse 72    Temp 98.1 °F (36.7 °C)    Resp 18    Wt 64.9 kg (143 lb)    SpO2 100%    BMI 21.74 kg/m2       Physical Exam:  Pt is awake and is alert   Heart reg rate and hrythm   Lungs good breath sounds heard   Abdomen soft and nontender  Neuro nonfocal      Lab/Data Reviewed:  CMP:   Lab Results   Component Value Date/Time     05/14/2017 10:55 PM    K 5.4 05/14/2017 10:55 PM     05/14/2017 10:55 PM    CO2 25 05/14/2017 10:55 PM    AGAP 10 05/14/2017 10:55 PM     (H) 05/14/2017 10:55 PM    BUN 27 (H) 05/14/2017 10:55 PM    CREA 6.62 (H) 05/14/2017 10:55 PM    GFRAA 7 (L) 05/14/2017 10:55 PM    GFRNA 6 (L) 05/14/2017 10:55 PM    CA 8.9 05/14/2017 10:55 PM    ALB 3.4 05/14/2017 10:55 PM    TP 7.3 05/14/2017 10:55 PM    GLOB 3.9 05/14/2017 10:55 PM    AGRAT 0.9 05/14/2017 10:55 PM    SGOT 26 05/14/2017 10:55 PM    ALT 15 05/14/2017 10:55 PM     CBC:   Lab Results   Component Value Date/Time    WBC 4.9 05/14/2017 10:55 PM    HGB 14.5 05/14/2017 10:55 PM    HCT 44.0 05/14/2017 10:55 PM     05/14/2017 10:55 PM           Assessment/Plan     Active Problems:  S/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

## 2017-05-15 NOTE — PROGRESS NOTES
Orders received and chart reviewed attempted to see for evaluation patient currently eating . Will attempt later today as time permits. 1612 second attempt patient just returned from echo and is going to dialysis will see tomorrow.

## 2017-05-15 NOTE — CONSULTS
Neurology Consult Note    I independently saw and examed the patient and I reviewed her history, PMH, SH, FH, ROS and imaging and other diagnostic study as well as her Lab study documented as the following. I agreed with the H/P and  A/P performed by Ms Olman Patten NP. Alyson Tee MD        Admit Date: 5/14/2017  Length of Stay: 0  Primary Care: Pepe Agosto MD   Principle Problem: <principal problem not specified>     Assessment:    1. Stroke    2. ESRD    Plan:    1. Stroke-  MRI showsacute infarct involving the left insular cortex along with abnormal FLAIR  hyperintense vessels along the left sylvian fissure suggestive  of high-grade proximal stenosis or occlusion. Left ICA and proximal MCA flow voids are grossly normal. Possible left sylvian fissure MCA branch  atherosclerotic plaque or embolus on prior CT, not definitive. Recommend dedicated MRA head follow-up. MRA series ordered wo contrast as ESRD. Currently on single antiplatelet therapy. If MRA comes back with high grade stenosis may need to place on dual antiplatelet therapy. Atorvastatin 80mg in place. Lovenox for VTE. Decreased to 30mg/day. TTE and 12 lead ordered for stroke work-up. Hypotensive during night with lowest 81/40. Avoid lowering BP during dialysis. Lisinopril currently on hold. History: Anabell Ochoa is a 75 yo AA female with PMH of DM, HTN, stroke, ESRD, and arthritis who presented to the ED with difficulty expressing herself. She resides with her daughter and when her daughter returned home she noticed she was having difficulty telling her what she wanted to eat for lunch. She denies having weakness or numbness at the time although ED record shows there was facial droop and right leg numbness. She was seen by the teleneurologist and alteplase was ruled out as she had returned to baseline.       Results reviewed:     CT Results (most recent):    Results from [facility] encounter on 05/14/17   CT HEAD WO CONT   Narrative EXAM: CT head without IV contrast    INDICATION: Suspected stroke. COMPARISON: None. TECHNIQUE: CT imaging of the head was obtained from skull base to vertex without  intravenous contrast.    One or more dose reduction techniques were used on this CT: automated exposure  control, adjustment of the mAs and/or kVp according to patient's size, and  iterative reconstruction techniques. The specific techniques utilized on this CT  exam have been documented in the patient's electronic medical record.    _______________    FINDINGS:    BRAIN AND POSTERIOR FOSSA: There is diffuse prominence of the ventricular  system, associated with proportional widening of the cortical cerebral sulci,  compatible with generalized volume loss. There is a well-circumscribed  hypodensity within the left thalamus. There is no intracranial hemorrhage, mass  effect, or shift of midline structures. Scattered periventricular and  subcortical hypoattenuation which is a nonspecific finding, most commonly  encountered in the setting of chronic microvascular disease. EXTRA-AXIAL SPACES AND MENINGES: There are no abnormal extra-axial fluid  collections    CALVARIUM: No acute osseous abnormality. SINUSES: The visualized portions of the paranasal sinuses and mastoid air cells  are well aerated. OTHER: There is intracranial atherosclerosis. _______________         Impression IMPRESSION:      1. Age-indeterminate left thalamic infarct. 2.  Moderate cerebral atrophy/volume loss and periventricular white matter  changes, most commonly seen with chronic microvascular disease. Somewhat more  confluent white matter hypodensity within the left centrum semiovale could  represent an age indeterminate infarct. 3. No acute intracranial hemorrhage.       MRI Results (most recent):    Results from [place] encounter on 05/14/17   MRI BRAIN WO CONT   Narrative MRI brain History: Slurred speech, facial droop, right leg weakness    Comparison: No prior study    Technique: Multiplanar multi sequence MR imaging of the brain was performed  utilizing axial T2, FLAIR, diffusion, T2-weighted gradient echo, and multiplanar  T1 pre contrast imaging     Findings:     Small area of abnormal restricted diffusion is present involving the posterior  aspect of the left insular cortex axial diffusion images 17 and 16. Patchy level  diffusion signal is also seen posteriorly in this perisylvian cortex and left  parietal lobe, diffusion images 19-23. ADC map signal in these areas is more  isointense. All of these areas do show T2/FLAIR hyperintensity. No mass effect  or associated hemorrhage is seen. There are areas of cortical atrophy with underlying T2/FLAIR hyperintensity  along this left perisylvian region particularly in the posterior left frontal  lobe as well as involving the precentral postcentral gyrus consistent with  chronic infarcts. Small chronic left cerebellar linear infarct is present. Chronic infarct is present involving the anterior left thalamus. Probable  additional chronic lacunar infarcts seen in bilateral basal ganglia,  interspersed with dilated perivascular spaces and chronic small vessel changes. A moderate amount of T2/FLAIR hyperintense white matter lesions are seen within  the periventricular and subcortical white matter , including patchy abnormal  signal in the brainstem, which are nonspecific, but likely represent chronic  small vessel changes. There are FLAIR hyperintense vessels along this left sylvian fissure and  adjacent sulci. The left ICA and proximal MCA flow voids are grossly normal.  Small focal density seen within left sylvian fissure on recent CT perhaps small  atherosclerotic plaque or embolus. There is no evidence of mass effect, hemorrhage, midline shift, or herniation. There is no abnormal restricted diffusion to suggest acute infarct. The major  intracranial vascular flow voids are grossly normal.     The orbits, paranasal sinuses, and mastoid air cells are unremarkable. Impression IMPRESSION:    1. Small focus of acute infarct involving the left insular cortex. Additional  small patchy areas of probable subacute infarct involving posterior perisylvian  cortex and left parietal lobe. No evidence of associated hemorrhage or mass  effect. 2.  Additional small chronic left perisylvian cortical infarcts particularly  posterior left frontal lobe and precentral gyrus. Additional chronic basal  ganglia lacunar infarcts, largest involving anterior left thalamus. 3. Moderate nonspecific white matter disease likely representing chronic small  vessel changes. 4. Abnormal FLAIR hyperintense vessels along the left sylvian fissure suggestive  of high-grade proximal stenosis or occlusion. Left ICA and proximal MCA flow  voids are grossly normal. Possible left sylvian fissure MCA branch  atherosclerotic plaque or embolus on prior CT, not definitive. Recommend  dedicated MRA head follow-up. Latest Hemoglobin A1C:  Lab Results   Component Value Date/Time    Hemoglobin A1c 5.9 05/15/2017 08:35 AM       Latest Cardiology Procedure:  Results for orders placed or performed during the hospital encounter of 01/13/16   EKG, 12 LEAD, INITIAL   Result Value Ref Range    Ventricular Rate 96 BPM    Atrial Rate 96 BPM    P-R Interval 134 ms    QRS Duration 90 ms    Q-T Interval 378 ms    QTC Calculation (Bezet) 477 ms    Calculated P Axis 69 degrees    Calculated R Axis 24 degrees    Calculated T Axis 98 degrees    Diagnosis       Normal sinus rhythm  Possible Left atrial enlargement  Septal infarct , age undetermined  ST & T wave abnormality, consider lateral ischemia  Abnormal ECG  When compared with ECG of 02-DEC-2010 17:30,  Vent.  rate has increased BY  35 BPM  Septal infarct is now present  T wave inversion now evident in Inferior leads  T wave inversion now evident in Lateral leads  Confirmed by Vikki Gay ([id number]) on 1/14/2016 3:54:07 PM         Important Labs:  No results found for: FOL, RBCF  Lab Results   Component Value Date/Time    Cholesterol, total 98 05/15/2017 08:35 AM    HDL Cholesterol 60 05/15/2017 08:35 AM    LDL, calculated 26.4 05/15/2017 08:35 AM    VLDL, calculated 11.6 05/15/2017 08:35 AM    Triglyceride 58 05/15/2017 08:35 AM    CHOL/HDL Ratio 1.6 05/15/2017 08:35 AM     Lab Results   Component Value Date/Time    TSH 0.85 05/15/2017 08:35 AM    Triiodothyronine (T3), free 2.1 05/15/2017 08:35 AM    T4, Free 0.9 05/15/2017 08:35 AM     No results found for: DS35, PHEN, PHENO, PHENT, DILF, DS39, PHENY, PTN, VALF2, VALAC, VALP, VALPR, DS6, CRBAM, CRBAMP, CARB2, XCRBAM  Discussed with: patient and nurse    Allergies: No Known Allergies   Review of Systems:    Y  N   Constitutional: [] [x] recent weight change  [] [x] fever  [] [x] sleep difficulties   ENT/Mouth:  [] [x] hearing loss  [] [x] swallowing problems  [x] [] slurred speech   Cardiovascular:  [] [x] chest pain   [] [x] palpitations    Respiratory: [] [x] cough with swallow  [] [x] shortness of breath  [] [x] sleep apnea  [] [] intubated   Gastrointestinal: [x] [] abdominal pain  [x] [] nausea   Genitourinary: [] [] frequent urination  [] [] incontinence    Musculoskeletal:   [x] [] joint pain  [] [x] muscle pain   Integument:   [] [x] rash/itching   Neurological:  [] [x] dizziness/vertigo  [] [x] sedation  [] [x] confusion  [] [] agitation/combativeness  [] [x] loss of consciousness  [] [x] numbness/tingling sensation  [] [x] tremors  [] [x] weakness in limbs  [] [x] difficulty with balance  [] [] frequent or recurring headaches  [] [x] memory loss   [] [] comatose  [] [] seizures   Psychiatric:   [] [x] depression  [] [] hallucinations   Endocrine: [] [] excessive thirst or urination   [] [] heat or cold intolerance   Hematologic/Lymphatic: [] [] bleeding tendency  [] [] enlarged lymph nodes     PMH:   Past Medical History:   Diagnosis Date    Anemia NEC     CAD (coronary artery disease)     Congestive heart failure, unspecified     Diabetes ([facility] [address]     Dyslipidemia     GERD (gastroesophageal reflux disease)     Glaucoma     HTN (hypertension)     Kidney failure     LVH (left ventricular hypertrophy)     Neuropathy         Problem List: Active Problems:    Encephalopathy (5/15/2017)        FH:   Family History   Problem Relation Age of Onset    Hypertension Other     Heart Disease Other     Diabetes Other     Diabetes Other     Heart Disease Other     Hypertension Other         SH:   Social History     Social History    Marital status:      Spouse name: N/A    Number of children: N/A    Years of education: N/A     Social History Main Topics    Smoking status: Never Smoker    Smokeless tobacco: Never Used    Alcohol use No    Drug use: No    Sexual activity: Not Asked     Other Topics Concern    None     Social History Narrative          Vital Signs:   Visit Vitals    /57 (BP 1 Location: Right leg, BP Patient Position: At rest)    Pulse 82    Temp 98 °F (36.7 °C)    Resp 18    Wt 64.9 kg (143 lb)    SpO2 100%    BMI 21.74 kg/m2      Neurological examination:    Appearance: In no acute distress, well developed, well nourished, cooperative   Cardiovascular: Heart is regular rate and rhythm, peripheral edema is absent. No carotid bruits heard. AV fistula RUE    Mental status examination: Alert and oriented X 3. No dysarthria and mild dysphasia. Normal attention, memory and fund of knowledge.    Cranial Nerves:     I: smell Not tested   II: visual fields Full to confrontation   II: pupils Equal, round, reactive to light   III,VII: ptosis none   III,IV,VI: extraocular muscles  Full ROM   V: facial light touch sensation  normal   V,VII: corneal reflex     VII: facial muscle function  normal   VIII: hearing    IX: soft palate elevation normal   IX,X: gag reflex present   XI: sternocleidomastoid strength 5/5   XII: tongue strength  normal        Motor exam: Station, gait:  deferred. No focal weakness in the limbs. No trouble lift up arms and briefly raise up her legs.  Sensory: Intact to primary modalities to face and bilateral arms. Decreased sharp/dull to BLE: RLE from calves down and LLE from ankles down. Pressure sensation intact.  Coordination: Normal rapid alternating movements, finger to nose testing.  Reflexes: Symmetric and 1+ in bilateral biceps, triceps, brachioradialis, patellar, ankle reflexes. Toes are down going.             Medications:      [x] REVIEWED  Current Facility-Administered Medications   Medication    sodium chloride 0.9 % bolus infusion 100 mL    enoxaparin (LOVENOX) injection 40 mg    ondansetron (ZOFRAN) injection 1 mg    aspirin (ASPIRIN) tablet 325 mg    atorvastatin (LIPITOR) tablet 80 mg    acetaminophen (TYLENOL) tablet 650 mg    acetaminophen (TYLENOL) suppository 650 mg    senna-docusate (PERICOLACE) 8.6-50 mg per tablet 2 Tab    pantoprazole (PROTONIX) 40 mg in sodium chloride 0.9 % 10 mL injection    albuterol (PROVENTIL VENTOLIN) nebulizer solution 2.5 mg    folic acid (FOLVITE) 1 mg, thiamine (B-1) 100 mg in 0.9% sodium chloride 50 mL ivpb    brinzolamide (AZOPT) 1 % ophthalmic suspension 1 Drop    brimonidine (ALPHAGAN) 0.2 % ophthalmic solution 1 Drop    B complex-vitaminC-folic acid (NEPHROCAP) cap    gabapentin (NEURONTIN) capsule 400 mg    sevelamer carbonate (RENVELA) tab 800 mg    PHARMACY INFORMATION NOTE    0.9% sodium chloride infusion    albumin human 25% (BUMINATE) solution 12.5 g    heparin (porcine) injection 1,000 Units    timolol (TIMOPTIC) 0.5 % ophthalmic solution 1 Drop     Data:      [x] REVIEWED  Recent Results (from the past 24 hour(s))   CBC W/O DIFF    Collection Time: 05/14/17 10:55 PM   Result Value Ref Range    WBC 4.9 4.6 - 13.2 K/uL    RBC 5.33 (H) 4.20 - 5.30 M/uL    HGB 14.5 12.0 - 16.0 g/dL    HCT 44.0 35.0 - 45.0 %    MCV 82.6 74.0 - 97.0 FL    MCH 27.2 24.0 - 34.0 PG    MCHC 33.0 31.0 - 37.0 g/dL    RDW 17.6 (H) 11.6 - 14.5 %    PLATELET 554 469 - 775 K/uL    MPV 10.9 9.2 - 78.0 FL   METABOLIC PANEL, COMPREHENSIVE    Collection Time: 05/14/17 10:55 PM   Result Value Ref Range    Sodium 136 136 - 145 mmol/L    Potassium 5.4 3.5 - 5.5 mmol/L    Chloride 101 100 - 108 mmol/L    CO2 25 21 - 32 mmol/L    Anion gap 10 3.0 - 18 mmol/L    Glucose 126 (H) 74 - 99 mg/dL    BUN 27 (H) 7.0 - 18 MG/DL    Creatinine 6.62 (H) 0.6 - 1.3 MG/DL    BUN/Creatinine ratio 4 (L) 12 - 20      GFR est AA 7 (L) >60 ml/min/1.73m2    GFR est non-AA 6 (L) >60 ml/min/1.73m2    Calcium 8.9 8.5 - 10.1 MG/DL    Bilirubin, total 0.7 0.2 - 1.0 MG/DL    ALT (SGPT) 15 13 - 56 U/L    AST (SGOT) 26 15 - 37 U/L    Alk.  phosphatase 141 (H) 45 - 117 U/L    Protein, total 7.3 6.4 - 8.2 g/dL    Albumin 3.4 3.4 - 5.0 g/dL    Globulin 3.9 2.0 - 4.0 g/dL    A-G Ratio 0.9 0.8 - 1.7     PROTHROMBIN TIME + INR    Collection Time: 05/14/17 10:55 PM   Result Value Ref Range    Prothrombin time 12.7 11.5 - 15.2 sec    INR 1.0 0.8 - 1.2     THROMBIN TIME    Collection Time: 05/14/17 10:55 PM   Result Value Ref Range    Thrombin time 17.8 13.8 - 18.2 SECS   CARDIAC PANEL,(CK, CKMB & TROPONIN)    Collection Time: 05/14/17 10:55 PM   Result Value Ref Range    CK 56 26 - 192 U/L    CK - MB 1.2 <3.6 ng/ml    CK-MB Index 2.1 0.0 - 4.0 %    Troponin-I, Qt. 0.02 0.0 - 0.045 NG/ML   FIBRINOGEN    Collection Time: 05/14/17 10:55 PM   Result Value Ref Range    Fibrinogen 435 210 - 451 mg/dL   TYPE & SCREEN    Collection Time: 05/14/17 10:55 PM   Result Value Ref Range    Crossmatch Expiration 05/17/2017     ABO/Rh(D) Denise Thomas NEGATIVE     Antibody screen NEG    ASPIRIN TEST    Collection Time: 05/14/17 10:55 PM   Result Value Ref Range    Aspirin test 634 ARU   PLATELET FUNCTION, VERIFY NOW P2Y12    Collection Time: 05/14/17 10:55 PM   Result Value Ref Range    P2Y12 Plt response 227 194 - 418 PRU   GLUCOSE, POC    Collection Time: 05/15/17  8:06 AM   Result Value Ref Range    Glucose (POC) 71 70 - 110 mg/dL   LIPID PANEL    Collection Time: 05/15/17  8:35 AM   Result Value Ref Range    LIPID PROFILE          Cholesterol, total 98 <200 MG/DL    Triglyceride 58 <150 MG/DL    HDL Cholesterol 60 40 - 60 MG/DL    LDL, calculated 26.4 0 - 100 MG/DL    VLDL, calculated 11.6 MG/DL    CHOL/HDL Ratio 1.6 0 - 5.0     HEMOGLOBIN A1C WITH EAG    Collection Time: 05/15/17  8:35 AM   Result Value Ref Range    Hemoglobin A1c 5.9 (H) 4.2 - 5.6 %    Est. average glucose 123 mg/dL   SED RATE (ESR)    Collection Time: 05/15/17  8:35 AM   Result Value Ref Range    Sed rate, automated 17 0 - 30 mm/hr   TSH 3RD GENERATION    Collection Time: 05/15/17  8:35 AM   Result Value Ref Range    TSH 0.85 0.36 - 3.74 uIU/mL   T3, FREE    Collection Time: 05/15/17  8:35 AM   Result Value Ref Range    Triiodothyronine (T3), free 2.1 (L) 2.3 - 4.2 PG/ML   T4, FREE    Collection Time: 05/15/17  8:35 AM   Result Value Ref Range    T4, Free 0.9 0.7 - 1.5 NG/DL       Maricel Pacheco NP

## 2017-05-15 NOTE — PROGRESS NOTES
[facility]   Discharge Planning/ Assessment    Reasons for Intervention: Chart reviewed. Met with pt., verified all demographics. Pt. Falling asleep & unable to answer questions, asked her if I could speak with her dtr, Marco Tate, states yes. Call placed to [facility] @ [phone], verified all demographics. States has MCR A,B,&D & she believes [employer]. States Dr. Niya Saenz is her PCP. Pt's dtr, Marco Tate lives with her. Has the following DME: walker, wheel chair & BSC. Goes to [facility], chair time 0600. Asked her about SNF for rehab, states she would like her mother to go to [facility].. Referral sent to [facility] via [address] [place] [address] in [facility]. PLAN: SNF when medically stable & bed available. Will cont to follow for further needs. Orly Mejía RN,ext. [phone].       High Risk Criteria  [x] Yes  []No   Physician Referral  [] Yes  [x]No        Date    Nursing Referral  [] Yes  [x]No        Date    Patient/Family Request  [] Yes  [x]No        Date       Resources:    Medicare  [x] Yes  []No   Medicaid  [] Yes  [x]No   No Resources  [] Yes  [x]No   Private Insurance  [x] Yes  []No    Name/Phone Number    Other  [] Yes  [x]No        (i.e. Workman's Comp)         Prior Services:    Prior Services  [] Yes  [x]No   Home Health  [] Yes  [x]No   [address] [place]  [] Yes  [x]No        Number of [address]  [] Yes  [x]No       Meals on Wheels  [] Yes  [x]No   Office on Aging  [] Yes  [x]No   Transportation Services  [] Yes  [x]No   Nursing Home  [] Yes  [x]No        Nursing Home Name    [address]  [] Yes  [x]No        [address] Name    Other       Information Source:      Information obtained from  [x] Patient  [] Parent   [] Guardian  [x] Child  [] Spouse   [] Significant Other/Partner   [] Friend      [] EMS    [] Nursing Home Chart          [] Other:   Chart Review  [x] Yes  []No     Family/Support System:    Patient lives with  [] Alone    [] Spouse   [] Significant Other  [x] Children  [] Caretaker   [] Parent  [] Sibling     [] Other       Other Support System:    Is the patient responsible for care of others  [] Yes  [x]No   Information of person caring for patient on  discharge    Managers financial affairs independently  [] Yes  [x]No   If no, explain:      Status Prior to Admission:    Mental Status  [x] Awake  [x] Alert  [x] Oriented  [x] Quiet/Calm [] Lethargic/Sedated   [] Disoriented  [] Restless/Anxious  [] Combative   Personal Care  [] Dependent  [] [address]  [x] Requires Assistance   Meal Preparation Ability  [] Independent   [] Standby Assistance   [] Minimal Assistance   [] Moderate Assistance  [] Maximum Assistance     [x] Total Assistance   Chores  [] Independent with Chores   [] N/A Nursing Home Resident   [x] Requires Assistance   Bowel/Bladder  [] Continent  [] Catheter  [x] Incontinent  [] Ostomy Self-Care    [] Urine Diversion Self-Care  [] Maximum Assistance     [] Total Assistance   Number of Persons needed for assistance    DME at home  [] Pilar Townsend  [] Herbie Townsend   [x] Commode    [] Bathroom/Grab Bars  [] Hospital Bed  [] Nebulizer  [] Oxygen           [] Raised Toilet Seat  [] Shower Chair  [] Side Rails for Bed   [] Tub Transfer Bench   [x] Hejosse Nava  [] Larisa Kun, Standard      [x] Other:  Wheel chair   Vendor      Treatment Presently Receiving:    Current Treatments  [] Chemotherapy  [x] Dialysis  [] Insulin  [] IVAB [x] IVF   [] O2  [] PCA   [x] PT   [] RT   [] Tube Feedings   [] Wound Care     Psychosocial Evaluation:    Verbalized Knowledge of Disease Process  [] Patient  []Family   Coping with Disease Process  [] Patient  []Family   Requires Further Counseling Coping with Disease Process  [] Patient  []Family     Identified Projected Needs:    Home Health Aid  [] Yes  [x]No   Transportation  [] Yes  [x]No   Education  [] Yes  [x]No        Specific Education Financial Counseling  [] Yes  [x]No   Inability to Care for Self/Will Require 24 hour care  [] Yes  [x]No   Pain Management  [] Yes  [x]No   Home Infusion Therapy  [] Yes  [x]No   Oxygen Therapy  [] Yes  [x]No   DME  [] Yes  [x]No   Long Term Care Placement  [] Yes  [x]No   Rehab  [x] Yes  []No   Physical Therapy  [x] Yes  []No   Needs Anticipated At This Time  [x] Yes  []No     Intra-Hospital Referral:    [address] [facility]  [] Yes  [x]No     [] Yes  [x]No   Patient Representative  [] Yes  [x]No   Staff for Teaching Needs  [] Yes  [x]No   Specialty Teaching Needs     Diabetic Educator  [] Yes  [x]No   Referral for Diabetic Educator Needed  [] Yes  [x]No  If Yes, place order for Nutritionist or Diabetic Consult     Tentative Discharge Plan:    Home with No Services  [] Yes  [x]No   Home with Home Health Follow-up  [] Yes  [x]No        If Yes, specify type    Home Care Program  [] Yes  [x]No        If Yes, specify type    Meals on Wheels  [] Yes  [x]No   Office of Aging  [] Yes  [x]No   NHP  [] Yes  [x]No   Return to the Nursing Home  [] Yes  [x]No   Rehab Therapy  [x] Yes  []No   Acute Rehab  [] Yes  [x]No   Subacute Rehab  [x] Yes  []No   Private Care  [] Yes  [x]No   Substance Abuse Referral  [] Yes  [x]No   Transportation  [] Yes  [x]No   Chore Service  [] Yes  [x]No   Inpatient Hospice  [] Yes  [x]No   OP RT  [] Yes  [x] No   OP Hemo  [] Yes  [x] No   OP PT  [] Yes  [x]No   Support Group  [] Yes  [x]No   Reach to Recovery  [] Yes  [x]No   OP Oncology Clinic  [] Yes  [x]No   Clinic Appointment  [] Yes  [x]No   DME  [] Yes  [x]No   Comments    Name of D/C Planner or  Given to Patient or Family Rebecca Davila   Phone Number Pager: [phone]        Extension Ext. [phone]. [phone]   Date 05-   Time    If you are discharged home, whom do you designate to participate in your discharge plan and receive any information needed?      Enter name of [address]        Phone # of designee [phone]        Address of designee [facility] [address]  [place] [address]        Updated         Patient refused to designate any           individual

## 2017-05-15 NOTE — ED PROVIDER NOTES
HPI Comments: Sherly Ugalde is a 74 y.o. female that was brought to the ED by her daughter with a complaint of slurred speech, facial droop and right leg numbness. Symptoms started approximately 1.5 hours ago. Medical history significant for LVH, CAD, Dialysis, DM, CHF and prior stroke. Patient is a 74 y.o. female presenting with altered mental status and numbness. Altered mental status    Associated symptoms include numbness. Numbness   Associated symptoms include altered mental status. Pertinent negatives include no shortness of breath and no chest pain. Past Medical History:   Diagnosis Date    Anemia NEC     CAD (coronary artery disease)     Congestive heart failure, unspecified     Diabetes ([facility] [address]     Dyslipidemia     GERD (gastroesophageal reflux disease)     Glaucoma     HTN (hypertension)     Kidney failure     LVH (left ventricular hypertrophy)     Neuropathy        Past Surgical History:   Procedure Laterality Date    HX BREAST BIOPSY  03/24/2010    excision left breast biopsy    HX HYSTERECTOMY      HX OTHER SURGICAL  12/3/10    I&D w/Pulsavac & wound vac application, left heel    HX OTHER SURGICAL  12/6/10    Debridement, left heel w/reduction of inferior calcaneal spur, left heel    HX POLYPECTOMY           Family History:   Problem Relation Age of Onset    Hypertension Other     Heart Disease Other     Diabetes Other     Diabetes Other     Heart Disease Other     Hypertension Other        Social History     Social History    Marital status:      Spouse name: N/A    Number of children: N/A    Years of education: N/A     Occupational History    Not on file.      Social History Main Topics    Smoking status: Never Smoker    Smokeless tobacco: Never Used    Alcohol use No    Drug use: No    Sexual activity: Not on file     Other Topics Concern    Not on file     Social History Narrative         ALLERGIES: Review of patient's allergies indicates no known allergies. Review of Systems   Constitutional: Negative for fatigue and fever. HENT: Negative for congestion, drooling, ear pain and facial swelling. Eyes: Negative for visual disturbance. Respiratory: Negative for cough and shortness of breath. Cardiovascular: Negative for chest pain and leg swelling. Gastrointestinal: Negative for abdominal pain. Musculoskeletal: Negative for arthralgias and back pain. Neurological: Positive for facial asymmetry and numbness. All other systems reviewed and are negative. Vitals:    05/14/17 2345 05/15/17 0030 05/15/17 0100 05/15/17 0115   BP: (!) 112/34 112/83 (!) 81/40 (!) 81/50   Pulse: 87 91 98 90   Resp: 20 22 20    Temp:       SpO2: 100% 100% 100% 99%   Weight:                Physical Exam   Constitutional: She appears well-developed and well-nourished. HENT:   Head: Normocephalic and atraumatic. Nose: Nose normal.   Slight facial droop on left side   Eyes: Conjunctivae are normal. Pupils are equal, round, and reactive to light. Right eye exhibits normal extraocular motion. Left eye exhibits normal extraocular motion. Right pupil is round and reactive. Left pupil is round and reactive. Neck: Normal range of motion. Neck supple. Cardiovascular: Normal rate, regular rhythm and normal heart sounds. Pulmonary/Chest: Effort normal and breath sounds normal. No respiratory distress. Musculoskeletal: She exhibits no edema. Right shoulder: She exhibits no tenderness and no bony tenderness. Neurological: She is alert. GCS eye subscore is 4. GCS verbal subscore is 5. GCS motor subscore is 6. Skin: Skin is warm and dry. Psychiatric: She has a normal mood and affect. Her behavior is normal. Cognition and memory are impaired.         MDM  Number of Diagnoses or Management Options  Diagnosis management comments: Labs Reviewed  CBC W/O DIFF - Abnormal; Notable for the following:      RBC                           5.33 (*) RDW                           17.6 (*)            All other components within normal limits  METABOLIC PANEL, COMPREHENSIVE - Abnormal; Notable for the following:      Glucose                       126 (*)                BUN                           27 (*)                 Creatinine                    6.62 (*)               BUN/Creatinine ratio          4 (*)                  GFR est AA                    7 (*)                  GFR est non-AA                6 (*)                  Alk. phosphatase              141 (*)             All other components within normal limits  PROTHROMBIN TIME + INR  THROMBIN TIME  CARDIAC PANEL,(CK, CKMB & TROPONIN)  FIBRINOGEN  ASPIRIN TEST  PLATELET FUNCTION, VERIFY NOW P2Y12  URINALYSIS W/ RFLX MICROSCOPIC  DRUG SCREEN, URINE  POC PT/INR  POC GLUCOSE BEDSIDE  POC URINE PREGNANCY TEST  TYPE & SCREEN    CT Results (most recent):  Results from Hospital Encounter encounter on 05/14/17    CT HEAD WO CONT    Narrative  EXAM: CT head without IV contrast    INDICATION: Suspected stroke. COMPARISON: None. TECHNIQUE: CT imaging of the head was obtained from skull base to vertex without  intravenous contrast.    One or more dose reduction techniques were used on this CT: automated exposure  control, adjustment of the mAs and/or kVp according to patient's size, and  iterative reconstruction techniques. The specific techniques utilized on this CT  exam have been documented in the patient's electronic medical record.    _______________    FINDINGS:    BRAIN AND POSTERIOR FOSSA: There is diffuse prominence of the ventricular  system, associated with proportional widening of the cortical cerebral sulci,  compatible with generalized volume loss. There is a well-circumscribed  hypodensity within the left thalamus. There is no intracranial hemorrhage, mass  effect, or shift of midline structures.   Scattered periventricular and  subcortical hypoattenuation which is a nonspecific finding, most commonly  encountered in the setting of chronic microvascular disease. EXTRA-AXIAL SPACES AND MENINGES: There are no abnormal extra-axial fluid  collections    CALVARIUM: No acute osseous abnormality. SINUSES: The visualized portions of the paranasal sinuses and mastoid air cells  are well aerated. OTHER: There is intracranial atherosclerosis. _______________          Impression  IMPRESSION:      1. Age-indeterminate left thalamic infarct. 2.  Moderate cerebral atrophy/volume loss and periventricular white matter  changes, most commonly seen with chronic microvascular disease. Somewhat more  confluent white matter hypodensity within the left centrum semiovale could  represent an age indeterminate infarct. 3. No acute intracranial hemorrhage. Consult:    2:29 AM   Discussed care with Josselyn Iqbal Tele Neuro. Standard discussion; including history of patients chief complaint, available diagnostic results, and treatment course. PLAN: admit pt for TIA work up, 100 cc per hour NS, 325 mg ASA TIA work up tomorrow    Consult:    2:32 AM   Discussed care with Dr Mere Garcia, Hospitalist.  Standard discussion; including history of patients chief complaint, available diagnostic results, and treatment course.    PLAN:                      Amount and/or Complexity of Data Reviewed  Clinical lab tests: ordered and reviewed  Tests in the radiology section of CPT®: ordered and reviewed  Tests in the medicine section of CPT®: ordered and reviewed    Risk of Complications, Morbidity, and/or Mortality  Presenting problems: high  Diagnostic procedures: high  Management options: high    Patient Progress  Patient progress: stable    ED Course       Procedures

## 2017-05-15 NOTE — PROGRESS NOTES
0330: Assumed care of alert and oriented female, Pt. Slow with responses and takes a few minutes to get her thoughts together. 0430: Pt. Is upset because her words are not coming out right. The pt. Is able to speak in full sentences but if having difficulty with some words and thoughts being able to communicate them. 0700: Bedside shift change report given to Lotus Felty, RN (oncoming nurse) by Malcolm Anderson RN (offgoing nurse). Report included the following information SBAR, Kardex, Procedure Summary, Intake/Output and MAR.

## 2017-05-15 NOTE — PROGRESS NOTES
Problem: Dysphagia (Adult)  Goal: *Acute Goals and Plan of Care (Insert Text)  Recommendations:  Diet: mech-soft/thin  Meds: per pt preference  Aspiration Precautions  Oral Care TID    Goals: Patient will:  1. Tolerate PO trials with 0 s/s overt distress in 4/5 trials  2. Utilize compensatory swallow strategies/maneuvers (decrease bite/sip, size/rate, alt. liq/sol) with min cues in 4/5 trials  [address] EVALUATION     Patient: Jatinder Estrada (89 y.o. female)  Date: 5/15/2017  Primary Diagnosis: Encephalopathy        Precautions: aspiration         ASSESSMENT :  Based on the objective data described below, the patient presents with mild OP dysphagia in the setting of encephalopathy. Pt alert and Ox4; endorses regular diet at home; denies dysphagia. Pt does report poor appetite for \"quite some time now\". Accepted serial swallows of ~4 oz thin liquids + straw, ~2 oz applesauce, and x 1 mech-soft cracker. Pt able to feed self. Noted mildly delayed oral bolus prep secondary to lethargy; however, with increased time, 100% oral clearance noted. SLP will follow 1-2 visits for diet tolerance and education as indicated. Patient will benefit from skilled intervention to address the above impairments. Patients rehabilitation potential is considered to be Good  Factors which may influence rehabilitation potential include:   [ ]            None noted  [X]            Mental ability/status  [X]            Medical condition  [X]            Home/family situation and support systems  [ ]            Safety awareness  [ ]            Pain tolerance/management  [ ]            Other:        PLAN :  Recommendations and Planned Interventions:  mech-soft  Frequency/Duration: Patient will be followed by speech-language pathology 1-2 visits to address goals. Discharge Recommendations: Skilled Nursing Facility       SUBJECTIVE:   Patient stated I feel okay, but I'm just not back to normal. OBJECTIVE:       Past Medical History:   Diagnosis Date    Anemia NEC      CAD (coronary artery disease)      Congestive heart failure, unspecified      Diabetes ([facility] [address]      Dyslipidemia      GERD (gastroesophageal reflux disease)      Glaucoma      HTN (hypertension)      Kidney failure      LVH (left ventricular hypertrophy)      Neuropathy       Past Surgical History:   Procedure Laterality Date    HX BREAST BIOPSY   03/24/2010     excision left breast biopsy    HX HYSTERECTOMY        HX OTHER SURGICAL   12/3/10     I&D w/Pulsavac & wound vac application, left heel    HX OTHER SURGICAL   12/6/10     Debridement, left heel w/reduction of inferior calcaneal spur, left heel    HX POLYPECTOMY         Prior Level of Function/Home Situation: lives with children  Home Situation  Home Environment: Private residence  One/Two Story Residence: Two story, live on 1st floor  Living Alone: No  Support Systems: Family member(s), Child(chiara)  Patient Expects to be Discharged to[de-identified] Private residence  Current DME Used/Available at Home: Wheelchair  Diet prior to admission: regular   Current Diet:  mech-soft   Cognitive and Communication Status:  Neurologic State: Alert  Orientation Level: Oriented X4  Cognition: Appropriate for age attention/concentration  Perception: Appears intact  Perseveration: No perseveration noted  Safety/Judgement: Fall prevention  Oral Assessment:  Oral Assessment  Labial: No impairment  Dentition: Limited  Oral Hygiene: Fair  Lingual: Decreased rate  Velum: No impairment  Mandible: No impairment  P.O. Trials:  Patient Position: EOB  Vocal quality prior to P.O.: No impairment  Consistency Presented: Thin liquid;Pudding; Solid  How Presented: Self-fed/presented;SLP-fed/presented;Straw;Successive swallows     Bolus Acceptance: No impairment  Bolus Formation/Control: Impaired  Type of Impairment: Delayed;Mastication  Propulsion: Delayed (# of seconds)  Oral Residue: None  Initiation of Swallow: Delayed (# of seconds)  Laryngeal Elevation: Functional  Aspiration Signs/Symptoms: None  Pharyngeal Phase Characteristics: Poor endurance  Effective Modifications: Small sips and bites  Cues for Modifications: Minimal        Oral Phase Severity: Mild  Pharyngeal Phase Severity : Mild     GCODESwallowing:  Swallow Current Status CI= 1-19%   Swallow Goal Status CH= 0%     The severity rating is based on the following outcomes:  VALE Noms Swallow Level 7              Clinical Judgement     PAIN:  Start of Eval: 0  End of Eval: 0      After treatment:   [ ]            Patient left in no apparent distress sitting up in chair  [X]            Patient left in no apparent distress in bed  [X]            Call bell left within reach  [X]            Nursing notified  [ ]            Family present  [ ]            Caregiver present  [ ]            Bed alarm activated      COMMUNICATION/EDUCATION:   [X]            Aspiration precautions; swallow safety; compensatory techniques. [X]            Patient/family have participated as able in goal setting and plan of care. [ ]            Patient/family agree to work toward stated goals and plan of care. [ ]            Patient understands intent and goals of therapy; neutral about participation. [ ]            Patient unable to participate in goal setting/plan of care; educ ongoing with interdisciplinary staff  [ ]             Posted safety precautions in patient's room.      Thank you for this referral.  Matt Coon, SLP  Time Calculation: 25 mins

## 2017-05-15 NOTE — H&P
History and Physical    Patient: Rohan Aquino               Sex: female          DOA: 5/14/2017       YOB: 1945      Age:  74 y.o.        LOS:  LOS: 0 days        Chief Complaint   Patient presents with    Altered mental status    Numbness         HPI:     Rohan Aquino is a 74 y.o. female with esrd on hemodialysis, MWF, Htn, glaucoma, neuropathy, gerd, dyslipidemia who presents with c/o AMS. Per family around 7 pm patient was noticed to be confused with slurred speech. Patient was brought to ED. Code S was activated. Patient was not a TPA candidate because symptoms has resolved. No report of facial weakness, focal weakness, blurry vision. CT head was done showing age indeterminate Left thalamus infarct. No acute intracranial abnormality. Tele neurology recommend admission for TIA workup. Past Medical History:   Diagnosis Date    Anemia NEC     CAD (coronary artery disease)     Congestive heart failure, unspecified     Diabetes ([facility] [address]     Dyslipidemia     GERD (gastroesophageal reflux disease)     Glaucoma     HTN (hypertension)     Kidney failure     LVH (left ventricular hypertrophy)     Neuropathy    . Past Surgical History:   Procedure Laterality Date    HX BREAST BIOPSY  03/24/2010    excision left breast biopsy    HX HYSTERECTOMY      HX OTHER SURGICAL  12/3/10    I&D w/Pulsavac & wound vac application, left heel    HX OTHER SURGICAL  12/6/10    Debridement, left heel w/reduction of inferior calcaneal spur, left heel    HX POLYPECTOMY         No current facility-administered medications on file prior to encounter. Current Outpatient Prescriptions on File Prior to Encounter   Medication Sig Dispense Refill    gabapentin (NEURONTIN) 400 mg capsule Take 400 mg by mouth. Take 400 mg every morning and 800 mg at bedtime      lisinopril (PRINIVIL, ZESTRIL) 20 mg tablet Take 20 mg by mouth daily.       brimonidine-timolol (COMBIGAN) 0.2-0.5 % Drop ophthalmic solution 1 Drop every twelve (12) hours.  sevelamer carbonate (RENVELA) 800 mg Tab tab Take 800 mg by mouth three (3) times daily.  brinzolamide (AZOPT) 1 % ophthalmic suspension Administer 1 Drop to both eyes two (2) times a day.  rosuvastatin (CRESTOR) 10 mg tablet Take 10 mg by mouth daily.  FOLIC ACID/VITAMIN B COMP W-C (RENAL CAPS PO) Take 1 Cap by mouth daily. Social History     Social History    Marital status:      Spouse name: N/A    Number of children: N/A    Years of education: N/A     Occupational History    Not on file. Social History Main Topics    Smoking status: Never Smoker    Smokeless tobacco: Never Used    Alcohol use No    Drug use: No    Sexual activity: Not on file     Other Topics Concern    Not on file     Social History Narrative       Prior to Admission Medications   Prescriptions Last Dose Informant Patient Reported? Taking? FOLIC ACID/VITAMIN B COMP W-C (RENAL CAPS PO)   Yes No   Sig: Take 1 Cap by mouth daily. brimonidine-timolol (COMBIGAN) 0.2-0.5 % Drop ophthalmic solution   Yes No   Si Drop every twelve (12) hours. brinzolamide (AZOPT) 1 % ophthalmic suspension   Yes No   Sig: Administer 1 Drop to both eyes two (2) times a day.     gabapentin (NEURONTIN) 400 mg capsule   Yes No   Sig: Take 400 mg by mouth. Take 400 mg every morning and 800 mg at bedtime   lisinopril (PRINIVIL, ZESTRIL) 20 mg tablet   Yes No   Sig: Take 20 mg by mouth daily. rosuvastatin (CRESTOR) 10 mg tablet   Yes No   Sig: Take 10 mg by mouth daily. sevelamer carbonate (RENVELA) 800 mg Tab tab   Yes No   Sig: Take 800 mg by mouth three (3) times daily.       Facility-Administered Medications: None       Family History   Problem Relation Age of Onset    Hypertension Other     Heart Disease Other     Diabetes Other     Diabetes Other     Heart Disease Other     Hypertension Other        Review of Systems: unable to obtain due to acuity      Physical Exam:       Visit Vitals    /58    Pulse 89    Temp 98.9 °F (37.2 °C)    Resp 12    Wt 64.9 kg (143 lb)    SpO2 99%    BMI 21.74 kg/m2     Physical Exam   Constitutional: She appears well-developed and well-nourished. No distress. HENT:   Head: Normocephalic and atraumatic. Mouth/Throat: Oropharynx is clear and moist. No oropharyngeal exudate. Eyes: Conjunctivae and EOM are normal. Pupils are equal, round, and reactive to light. Neck: Neck supple. Cardiovascular: Normal rate, regular rhythm and normal heart sounds. No murmur heard. Pulmonary/Chest: Effort normal and breath sounds normal. No respiratory distress. She has no wheezes. She has no rales. Abdominal: Soft. Bowel sounds are normal. She exhibits no distension. There is no tenderness. There is no rebound and no guarding. Neurological: She is alert. She has normal strength. No cranial nerve deficit or sensory deficit. GCS eye subscore is 4. GCS verbal subscore is 5. GCS motor subscore is 6. Oriented to person and place and time  Struggles with articulation   Skin: Skin is warm. No rash noted. She is not diaphoretic. No erythema. No pallor. Psychiatric: She has a normal mood and affect. She is slowed. Ancillary Studies: All lab and imaging reviewed for the past 24 hours.     Recent Results (from the past 24 hour(s))   CBC W/O DIFF    Collection Time: 05/14/17 10:55 PM   Result Value Ref Range    WBC 4.9 4.6 - 13.2 K/uL    RBC 5.33 (H) 4.20 - 5.30 M/uL    HGB 14.5 12.0 - 16.0 g/dL    HCT 44.0 35.0 - 45.0 %    MCV 82.6 74.0 - 97.0 FL    MCH 27.2 24.0 - 34.0 PG    MCHC 33.0 31.0 - 37.0 g/dL    RDW 17.6 (H) 11.6 - 14.5 %    PLATELET 355 195 - 339 K/uL    MPV 10.9 9.2 - 58.9 FL   METABOLIC PANEL, COMPREHENSIVE    Collection Time: 05/14/17 10:55 PM   Result Value Ref Range    Sodium 136 136 - 145 mmol/L    Potassium 5.4 3.5 - 5.5 mmol/L    Chloride 101 100 - 108 mmol/L    CO2 25 21 - 32 mmol/L    Anion gap 10 3.0 - 18 mmol/L    Glucose 126 (H) 74 - 99 mg/dL    BUN 27 (H) 7.0 - 18 MG/DL    Creatinine 6.62 (H) 0.6 - 1.3 MG/DL    BUN/Creatinine ratio 4 (L) 12 - 20      GFR est AA 7 (L) >60 ml/min/1.73m2    GFR est non-AA 6 (L) >60 ml/min/1.73m2    Calcium 8.9 8.5 - 10.1 MG/DL    Bilirubin, total 0.7 0.2 - 1.0 MG/DL    ALT (SGPT) 15 13 - 56 U/L    AST (SGOT) 26 15 - 37 U/L    Alk. phosphatase 141 (H) 45 - 117 U/L    Protein, total 7.3 6.4 - 8.2 g/dL    Albumin 3.4 3.4 - 5.0 g/dL    Globulin 3.9 2.0 - 4.0 g/dL    A-G Ratio 0.9 0.8 - 1.7     PROTHROMBIN TIME + INR    Collection Time: 05/14/17 10:55 PM   Result Value Ref Range    Prothrombin time 12.7 11.5 - 15.2 sec    INR 1.0 0.8 - 1.2     THROMBIN TIME    Collection Time: 05/14/17 10:55 PM   Result Value Ref Range    Thrombin time 17.8 13.8 - 18.2 SECS   CARDIAC PANEL,(CK, CKMB & TROPONIN)    Collection Time: 05/14/17 10:55 PM   Result Value Ref Range    CK 56 26 - 192 U/L    CK - MB 1.2 <3.6 ng/ml    CK-MB Index 2.1 0.0 - 4.0 %    Troponin-I, Qt. 0.02 0.0 - 0.045 NG/ML   FIBRINOGEN    Collection Time: 05/14/17 10:55 PM   Result Value Ref Range    Fibrinogen 435 210 - 451 mg/dL   TYPE & SCREEN    Collection Time: 05/14/17 10:55 PM   Result Value Ref Range    Crossmatch Expiration 05/17/2017     ABO/Rh(D) Ailyn Sharmin NEGATIVE     Antibody screen NEG    ASPIRIN TEST    Collection Time: 05/14/17 10:55 PM   Result Value Ref Range    Aspirin test 634 ARU   PLATELET FUNCTION, VERIFY NOW P2Y12    Collection Time: 05/14/17 10:55 PM   Result Value Ref Range    P2Y12 Plt response 227 194 - 418 PRU       Assessment/Plan     Active Problems:    Encephalopathy (5/15/2017)      TIA  ESRD  HTN  Glaucoma  GERD    PLAN:    Possible TIA . CT head reviewed. Age indeterminate thalamic infarct. Stroke work up MRI Brain ordered  Permissible htn for 24 - 48 hr  Consult Nephrology for dialysis.  MWF  Continue eye drops  Continue aspirin  PT/OT/SPEECH Eval  Neuro check every 1 hour per [facility] neurology  DVT Prophylaxis   Monitor BP now borderline low.  On /hr   Neurology consult in AM       Jean Claude Vargas MD  5/15/2017  2:20 AM

## 2017-05-15 NOTE — PROGRESS NOTES
Problem: Self Care Deficits Care Plan (Adult)  Goal: *Acute Goals and Plan of Care (Insert Text)  Occupational Therapy Goals  Initiated 5/15/2017 within 7 day(s). 1. Patient will perform grooming tasks at EOB with supervision for balance. 2. Patient will perform upper body dressing with supervision and minimal verbal cues to maintain midline in sitting. 3. Patient will perform functional task in standing for 3 minutes with minimal assistance to increase activity tolerance for ADLs. 4. Patient will perform toilet transfers with moderate assistance . 5. Patient will perform all aspects of toileting with moderate assistance . 6. Patient will participate in upper extremity therapeutic exercise/activities with minimal assistance for 8 minutes to increase/maintain strength/AROM of BUEs for ADLs. 7. Patient will utilize energy conservation techniques during functional activities with minimal verbal cues. Outcome: Progressing Towards Goal  OCCUPATIONAL THERAPY EVALUATION     Patient: Tamra Kamara (89 y.o. female)  Date: 5/15/2017  Primary Diagnosis: Encephalopathy        Precautions:  Fall      ASSESSMENT :  Based on the objective data described below, the patient presents with impairments with regard to bed mobility in preparation for ADLs, activity tolerance, BUE function/strength, and overall independence in ADLs. Pt supine on arrival with Etta Greer NP and Shawn Benedict RN present. Min A/additional time to maneuver to EOB. Pt c/o 8/10 pain in BLEs during movement. Poor dynamic sitting balance noted during AROM/MMT of BUEs; fair static sitting balance. Attempted to stand pt 2x; unable to clear off bed surface with max A. Pt demo'd decreased activity tolerance; requesting to lay down at end of session. Pt returned to supine; educated on role of OT and POC; she verbalized understanding. Per pt report, has assistance for self care tasks PTA. Pt left with needs within reach; Shawn Benedict RN made aware of session.      Patient will benefit from skilled intervention to address the above impairments. Patients rehabilitation potential is considered to be Good  Factors which may influence rehabilitation potential include:   [ ]             None noted  [ ]             Mental ability/status  [X]             Medical condition  [ ]             Home/family situation and support systems  [ ]             Safety awareness  [ ]             Pain tolerance/management  [ ]             Other:           PLAN :  Recommendations and Planned Interventions:  [X]               Self Care Training                  [X]        Therapeutic Activities  [X]               Functional Mobility Training    [ ]        Cognitive Retraining  [X]               Therapeutic Exercises           [X]        Endurance Activities  [X]               Balance Training                   [ ]        Neuromuscular Re-Education  [ ]               Visual/Perceptual Training     [X]   Home Safety Training  [X]               Patient Education                 [ ]        Family Training/Education  [ ]               Other (comment):     Frequency/Duration: Patient will be followed by occupational therapy 4-7 times a week to address goals. Discharge Recommendations: Rehab  Further Equipment Recommendations for Discharge: bedside commode, shower chair       SUBJECTIVE:   Patient stated I was walking fine a few years ago. \"      OBJECTIVE DATA SUMMARY:       Past Medical History:   Diagnosis Date    Anemia NEC      CAD (coronary artery disease)      Congestive heart failure, unspecified      [facility] [address]      Dyslipidemia      GERD (gastroesophageal reflux disease)      Glaucoma      HTN (hypertension)      Kidney failure      LVH (left ventricular hypertrophy)      Neuropathy       Past Surgical History:   Procedure Laterality Date    HX BREAST BIOPSY   03/24/2010     excision left breast biopsy    HX HYSTERECTOMY        HX OTHER SURGICAL   12/3/10     I&D w/Pulsavac & wound vac application, left heel    HX OTHER SURGICAL   12/6/10     Debridement, left heel w/reduction of inferior calcaneal spur, left heel    HX POLYPECTOMY         Barriers to Learning/Limitations: None  Compensate with: visual, verbal, tactile, kinesthetic cues/model     GCODES:  Self Care  Current  CL= 60-79%   Goal  CJ= 20-39%. The severity rating is based on the Other Functional Assessment, MMT, ROM     Eval Complexity: History: MEDIUM Complexity : Expanded review of history including physical, cognitive and psychosocial  history ; Examination: MEDIUM Complexity : 3-5 performance deficits relating to physical, cognitive , or psychosocial skils that result in activity limitations and / or participation restrictions; Decision Making:MEDIUM Complexity : Patient may present with comorbidities that affect occupational performnce. Miniml to moderate modification of tasks or assistance (eg, physical or verbal ) with assesment(s) is necessary to enable patient to complete evaluation      Prior Level of Function/Home Situation: Per pt report, pt is independent with self-feeding and grooming tasks; requires assistance with dressing, bathing, and all IADLs. Utilizes rollator and power chair (when going to dialysis) for functional mobility PTA. Home Situation  Home Environment: Private residence  # Steps to Enter: 1  One/Two Story Residence: Two story, live on 1st floor  Living Alone: No  Support Systems: Family member(s)  Patient Expects to be Discharged to[de-identified] Private residence  Current DME Used/Available at Home: Prashant Grime, rollator, Wheelchair, Grab bars, power chair  Tub or Shower Type: Tub/Shower combination (pt sponge bathes at bed level)  [X]  Right hand dominant          [ ]  Left hand dominant     Cognitive/Behavioral Status:  Neurologic State: Alert  Orientation Level: Oriented X4  Cognition: Appropriate decision making; Appropriate for age attention/concentration; Appropriate safety awareness; Follows commands  Safety/Judgement: Awareness of environment; Fall prevention      Skin: Intact (BUEs)  Edema: None noted (BUEs)     Vision/Perceptual:    Acuity: Within Defined Limits       Coordination:  Coordination: Within functional limits (BUEs)  Fine Motor Skills-Upper: Right Intact; Left Intact    Gross Motor Skills-Upper: Right Intact; Left Intact      Balance:  Sitting: Impaired  Sitting - Static: Fair (occasional)  Sitting - Dynamic: Poor (constant support)  Standing:  (not assessed; not able to achieve full standing position)     Strength:  Strength: Generally decreased, functional (BUEs: approx 4/5)     Tone & Sensation:  Tone: Normal (BUEs)  Sensation: Intact (BUEs)     Range of Motion:  AROM: Generally decreased, functional (BUEs: approx 3/4 shoulder flexion secondary to tightness)  PROM: Within functional limits (BUEs)     Functional Mobility and Transfers for ADLs:  Bed Mobility:  Rolling: Minimum assistance; Additional time  Supine to Sit: Minimum assistance; Additional time  Sit to Supine: Maximum assistance (for management of BLEs)  Scooting: Minimum assistance; Additional time      Transfers:  Sit to Stand: Maximum assistance; Additional time (unable to clear off of bedsurface despite max A)              Toilet Transfer :  (not assessed)                ADL Assessment:  Feeding: Setup;Supervision  Oral Facial Hygiene/Grooming: Setup;Supervision  Bathing: Maximum assistance  Upper Body Dressing: Minimum assistance  Lower Body Dressing: Maximum assistance  Toileting: Total assistance (craig catheter)     Cognitive Retraining  Safety/Judgement: Awareness of environment; Fall prevention     Pain:  Pre treatment pain level: 4/10  During bed mobility: 9/10  Post treatment pain level: 4/10  Pain Scale 1: Numeric (0 - 10)  Pain Location: BLE's     Activity Tolerance:  Poor+  Please refer to the flowsheet for vital signs taken during this treatment.   After treatment:   [ ] Patient left in no apparent distress sitting up in chair  [X] Patient left in no apparent distress in bed  [X] Call bell left within reach  [X] Nursing notified  [ ] Caregiver present  [X] Bed alarm activated      COMMUNICATION/EDUCATION: Patient educated on role of OT, POC, and home safety. She verbalized understanding.   [X] Home safety education was provided and the patient/caregiver indicated understanding. [X] Patient/family have participated as able in goal setting and plan of care. [X] Patient/family agree to work toward stated goals and plan of care. [ ] Patient understands intent and goals of therapy, but is neutral about his/her participation. [ ] Patient is unable to participate in goal setting and plan of care.      Thank you for this referral.     Kathrine Moore MS OTR/L  Time Calculation: 20 mins

## 2017-05-15 NOTE — ED TRIAGE NOTES
Pt daughter brought pt in for slurred speech. Pt states that she does not feel well and c/o right sided leg numbness that is new. No weakness, pt is confused to date and time.    Start time 1.5 hours ago  Stroke alert level 1 called

## 2017-05-15 NOTE — ED NOTES
BUZZ Zhang in triage to see patient.  [facility], ED tech transported patient to the back

## 2017-05-15 NOTE — PROGRESS NOTES
Nutrition initial assessment/  Plan of care      RECOMMENDATIONS:     1. Dental Soft; 2 gm NA, No concentrated sweets diet  2. SF CIB TID   3. Monitor weight, labs and PO intake  4. RD to follow     GOALS:     1. PO intake meets >75% of protein/calorie needs by 5/20  2. Weight Maintenance (+/- 1-2 lb by 5/20     ASSESSMENT:     Weight status is classified as normal per BMI of 22.4. However, patient is at nutrition risk due to BMI below 23 with patient above 72years of age. PO intake is fair. Added SF CIB TID for additional calories/protein. Labs noted. Bun/Creatitine levels elevated vs ESRD (GRF: 6). Nutrition recommendations listed. RD to follow. Nutrition Diagnoses: Altered nutrition related lab values related to ESRD as evidenced by GFR of 6. Inadequate oral intake related to variable appetite as evidenced by 25% intake of lunch meal.    Nutrition Risk:  [] High  [x] Moderate []  Low    SUBJECTIVE/OBJECTIVE:      Patient admitted for possible stroke due to right sided leg numbness and slurred speech. Patient receiving hemodialysis M/W/F for ESRD. Denies food allergies. Reviewed SLP note. Patient with mild OP dysphagia in the setting of encephalopathy and recommends mech soft diet. Patient reports fair appetite and has lost about 5 lb over past several months. Unsure of UBW. Patient with 10 lb weight gain in past year per documented weights. Observed 25% intake of lunch meal and 100% intake of CIB supplement during visit. Encouraged adequate intake. Will monitor. Information Obtained from:    [x] Chart Review   [x] Patient   [] Family/Caregiver   [] Nurse/Physician   [] Interdisciplinary Meeting/Rounds    Diet: Dental Soft; 2 gm NA, No concentrated sweets diet  Medications: [x] Reviewed    Allergies: [x] Reviewed   Encounter Diagnoses     ICD-10-CM ICD-9-CM   1.  Transient cerebral ischemia, unspecified type G45.9 435.9     Past Medical History:   Diagnosis Date    Anemia NEC     CAD (coronary artery disease)     Congestive heart failure, unspecified     [facility] ([facility] [address]     Dyslipidemia     GERD (gastroesophageal reflux disease)     Glaucoma     HTN (hypertension)     Kidney failure     LVH (left ventricular hypertrophy)     Neuropathy       Labs:    Lab Results   Component Value Date/Time    Sodium 136 05/14/2017 10:55 PM    Potassium 5.4 05/14/2017 10:55 PM    Chloride 101 05/14/2017 10:55 PM    CO2 25 05/14/2017 10:55 PM    Anion gap 10 05/14/2017 10:55 PM    Glucose 126 05/14/2017 10:55 PM    BUN 27 05/14/2017 10:55 PM    Creatinine 6.62 05/14/2017 10:55 PM    Calcium 8.9 05/14/2017 10:55 PM    Albumin 3.4 05/14/2017 10:55 PM     Anthropometrics:  BMI: 22.4  Last 3 Recorded Weights in this Encounter    05/14/17 2221   Weight: 64.9 kg (143 lb)      Ht Readings from Last 1 Encounters:   05/15/17 5' 7\" (1.702 m)     Patient Vitals for the past 100 hrs:   % Diet Eaten   05/15/17 0946 25 %     IBW: 135 lb %IBW: 106%    Estimated Nutrition Needs: [x] MSJ  [] Other:  Calories: 4247-0464 Kcal Based on:   [x] Actual BW    Protein:   65-78 g Based on:   [x] Actual BW    Fluid:       6750-6954 ml Based on:   [x] Actual BW      [x] No Cultural, Christianity or ethnic dietary need identified.     [] Cultural, Christianity and ethnic food preferences identified and addressed     Wt Status:  [x] Normal (18.6 - 24.9) [] Underweight (<18.5) [] Overweight (25 - 29.9) [] Mild Obesity (30 - 34.9)  [] Moderate Obesity (35 - 39.9) [] Morbid Obesity (40+)     Nutrition Problems Identified:   [x] Suboptimal PO intake   [] Food Allergies  [x] Difficulty chewing/swallowing/poor dentition  [] Constipation/Diarrhea   [] Nausea/Vomiting   [] None  [] Other:     Plan:   [x] Therapeutic Diet  [x]  Obtained/adjusted food preferences/tolerances and/or snacks options   [x]  Supplements added   [x] Occupational therapy following for feeding techniques  []  HS snack added   [x]  Modify diet texture   []  Modify diet for food allergies []  Assist with menu selection   [x]  Monitor PO intake on meal rounds   [x]  Continue inpatient monitoring and intervention   []  Participated in discharge planning/Interdisciplinary rounds/Team meetings   []  Other:     Education Needs:   [] Not appropriate for teaching at this time due to:   [x] Identified and addressed    Nutrition Monitoring and Evaluation:  [x] Continue ongoing monitoring and intervention  [] Other    Serena Simmering

## 2017-05-15 NOTE — CONSULTS
Consult Note    Assessment:     1. ESRD. Stable volume status, electrolytes. 2. HTN, stable off medications. Hypotension upon admission has resolved. 3. Anemia of ESRD. H/H is above goal.   4. Secondary hyperparathyroidism of ESRD. 5. New cva. Neurology on the case. Recommendations:   1. Hemodialysis later today and continue mwf. 2. For now continue to hold antihtn. 3. No need for epogen. 4. Continue phos binders. 5. Avoid Gadolinium due to its association with nephrogenic systemic fibrosis in a patients with severe ARF and ESRD. 6. Please dose all medications for  creatinine clearance <15/dialysis. 7. Protect right  arm from blood pressure checks, blood draws, peripheral iv's. Avoid PICC lines on either arm in order to preserve veins for dialysis access creation. Consult requested by: Allen Carolina MD    ADMIT DATE: 5/14/2017  CONSULT DATE: May 15, 2017                 Admission diagnosis: cva. Reason for Nephrology Consultation: Management of ESRD  HPI: Geraldo Guerra is a 74 y.o. female [address] with h/o of dm, htn, prior cva and known diagnosis of ESRD for which he receives hemodialysis on MWF schedule at [facility] outpatient unit in [facility]. Patient's nephrologist is Dr. Duane Garcia. Patient dialyzes for 3.5 hours. Rt arm avg is used for dialysis access. Estimated target weight is unknown to the patient. Last dialysis at outpatient unit was on 5/12 and was uneventful. Patient presented to [facility] ED with acute onset of altered ms, rt facial droop and aphasia. Upon presentation she was slightly hypotensive but o/w stable. Patient was admitted with concern for acute cva. In fact  MRI showed acute infarct involving the left insular cortex. Meanwhile patient feels better, she is alert and appropriate.         Past Medical History:   Diagnosis Date    Anemia NEC     CAD (coronary artery disease)     Congestive heart failure, unspecified     Diabetes ([facility] [address]     Dyslipidemia     ESRD (end stage renal disease) on dialysis ([facility] [address]     [place] [facility]iecy [place].  GERD (gastroesophageal reflux disease)     Glaucoma     HTN (hypertension)     Kidney failure     LVH (left ventricular hypertrophy)     Neuropathy       Past Surgical History:   Procedure Laterality Date    HX BREAST BIOPSY  03/24/2010    excision left breast biopsy    HX HYSTERECTOMY      HX OTHER SURGICAL  12/3/10    I&D w/Pulsavac & wound vac application, left heel    HX OTHER SURGICAL  12/6/10    Debridement, left heel w/reduction of inferior calcaneal spur, left heel    HX POLYPECTOMY         Social History     Social History    Marital status:      Spouse name: N/A    Number of children: N/A    Years of education: N/A     Occupational History    Not on file. Social History Main Topics    Smoking status: Never Smoker    Smokeless tobacco: Never Used    Alcohol use No    Drug use: No    Sexual activity: Not on file     Other Topics Concern    Not on file     Social History Narrative       Family History   Problem Relation Age of Onset    Hypertension Other     Heart Disease Other     Diabetes Other     Diabetes Other     Heart Disease Other     Hypertension Other      No Known Allergies     Home Medications:     Prescriptions Prior to Admission   Medication Sig    gabapentin (NEURONTIN) 400 mg capsule Take 400 mg by mouth. Take 400 mg every morning and 800 mg at bedtime    lisinopril (PRINIVIL, ZESTRIL) 20 mg tablet Take 20 mg by mouth daily.  brimonidine-timolol (COMBIGAN) 0.2-0.5 % Drop ophthalmic solution 1 Drop every twelve (12) hours.  sevelamer carbonate (RENVELA) 800 mg Tab tab Take 800 mg by mouth three (3) times daily.  brinzolamide (AZOPT) 1 % ophthalmic suspension Administer 1 Drop to both eyes two (2) times a day.  rosuvastatin (CRESTOR) 10 mg tablet Take 10 mg by mouth daily.     FOLIC ACID/VITAMIN B COMP W-C (RENAL CAPS PO) Take 1 Cap by mouth daily. Current Inpatient Medications:     Current Facility-Administered Medications   Medication Dose Route Frequency    ondansetron (ZOFRAN) injection 1 mg  1 mg IntraVENous Q6H PRN    aspirin (ASPIRIN) tablet 325 mg  325 mg Oral DAILY    atorvastatin (LIPITOR) tablet 80 mg  80 mg Oral QHS    acetaminophen (TYLENOL) tablet 650 mg  650 mg Oral Q4H PRN    acetaminophen (TYLENOL) suppository 650 mg  650 mg Rectal Q4H PRN    senna-docusate (PERICOLACE) 8.6-50 mg per tablet 2 Tab  2 Tab Oral QHS    pantoprazole (PROTONIX) 40 mg in sodium chloride 0.9 % 10 mL injection  40 mg IntraVENous Q12H    albuterol (PROVENTIL VENTOLIN) nebulizer solution 2.5 mg  2.5 mg Nebulization Z5T PRN    folic acid (FOLVITE) 1 mg, thiamine (B-1) 100 mg in 0.9% sodium chloride 50 mL ivpb   IntraVENous DAILY    brinzolamide (AZOPT) 1 % ophthalmic suspension 1 Drop  1 Drop Both Eyes BID    brimonidine (ALPHAGAN) 0.2 % ophthalmic solution 1 Drop  1 Drop Both Eyes BID    B complex-vitaminC-folic acid (NEPHROCAP) cap  1 Cap Oral DAILY    gabapentin (NEURONTIN) capsule 400 mg  400 mg Oral TID    sevelamer carbonate (RENVELA) tab 800 mg  800 mg Oral TID    PHARMACY INFORMATION NOTE  1 Each Other ONCE    0.9% sodium chloride infusion  50 mL/hr IntraVENous DIALYSIS PRN    albumin human 25% (BUMINATE) solution 12.5 g  12.5 g IntraVENous DIALYSIS PRN    timolol (TIMOPTIC) 0.5 % ophthalmic solution 1 Drop  1 Drop Both Eyes BID    [START ON 5/16/2017] enoxaparin (LOVENOX) injection 30 mg  30 mg SubCUTAneous Q24H       Review of Systems:   No fever or chills. No sore throat. No cough or hemoptysis. No shortness of breath or chest pain. No orthopnea or paroxysmal nocturnal dyspnea. Good appetite. No nausea, vomiting, abdominal pain, melena or hematochezia. No constipation or diarrhea. No dysuria, no gross hematuria of voiding difficulties, makes little urine. No ankle swelling, no joint paints. No muscle aches. No skin changes. C/o occasional dizziness and lightheadedness. No headaches. Physical Assessment:     Vitals:    05/15/17 0245 05/15/17 0634 05/15/17 0946 05/15/17 1432   BP: 118/58 153/82 150/57 136/49   Pulse: 89 92 82 79   Resp: 12 16 18 18   Temp:  98.4 °F (36.9 °C) 98 °F (36.7 °C) 97.8 °F (36.6 °C)   SpO2: 99% (!) 89% 100% 100%   Weight:         Last 3 Recorded Weights in this Encounter    05/14/17 2221   Weight: 64.9 kg (143 lb)     Admission weight: Weight: 64.9 kg (143 lb) (05/14/17 2221)      Intake/Output Summary (Last 24 hours) at 05/15/17 1704  Last data filed at 05/15/17 1159   Gross per 24 hour   Intake           419.16 ml   Output                0 ml   Net           419.16 ml       Patient is in no apparent distress. HEENT: Head is normocephalic and atraumatic. Pupils are round, equal, reactive to light. Sclerae are anicteric. Oropharynx clear. Neck: no cervical lymphadenopathy or thyromegaly. Lungs: good air entry, clear to auscultation bilaterally. Trachea at the midline. Cardiovascular system: S1, S2, regular rate and rhythm. No murmurs, gallops or rubs. No jvd. Carotid upstroke 2 + bilaterally. Abdomen: soft, non tender, non distended. Positive bowel sounds. No hepatosplenomegaly. No abdominal bruits. Extremities: no clubbing, cyanosis or edema. Strong dorsalis pedis pulses. Brisk capillary refill on the toes bilaterally. Integumentary: skin is grossly intact. Neurologic: Alert, oriented time three. Cooperative and appropriate. No gross motor or sensory deficits. Dialysis access: Rt arm avg.        Data Review:    Labs: Results:       Chemistry Recent Labs      05/14/17 2255   GLU  126*   NA  136   K  5.4   CL  101   CO2  25   BUN  27*   CREA  6.62*   CA  8.9   AGAP  10   BUCR  4*   AP  141*   TP  7.3   ALB  3.4   GLOB  3.9   AGRAT  0.9         CBC w/Diff Recent Labs      05/14/17   2255   WBC  4.9   RBC  5.33*   HGB  14.5   HCT  44.0   PLT  150 Iron/Ferritin No results for input(s): IRON in the last 72 hours. No lab exists for component: TIBCCALC   PTH/VIT D No results for input(s): PTH in the last 72 hours.     No lab exists for component: VITD            Fiona Traore M.D  [facility]  Office [phone]  Pager [phone]    May 15, 2017

## 2017-05-15 NOTE — MED STUDENT NOTES
*ATTENTION:  This note has been created by a medical student for educational purposes only. Please do not refer to the content of this note for clinical decision-making, billing, or other purposes. Please see attending physicians note to obtain clinical information on this patient. *       Student Progress Note  Please refer to attendings daily rounding note for full details      Patient: Kaylene Suero MRN: [id number]  CSN: [id number]    YOB: 1945  Age: 74 y.o. Sex: female    DOA: 5/14/2017 LOS:  LOS: 0 days          Chief Complaint: slurred speech, altered mental status    Subjective:    73 yo female presented to the ED with acute onset slurred speech and altered mental status on 5/14. Hx of stroke, HTN, Diabetes, ESRD on dialysis. CT of the head showed no acute findings and teleneurology recommended admit for TIA work up. Pt was away at dialysis today. Objective:      Visit Vitals    /57 (BP 1 Location: Right leg, BP Patient Position: At rest)    Pulse 82    Temp 98 °F (36.7 °C)    Resp 18    Wt 64.9 kg (143 lb)    SpO2 100%    BMI 21.74 kg/m2         Physical Exam: PT was at dialysis will try later to see her   Gen:   HEENT:   CV:   Resp:  Abd:   Extrem:    Skin:   Neuro:       I have reviewed the patient's Labs and Radiology studies.   Lab Results   Component Value Date/Time    WBC 4.9 05/14/2017 10:55 PM    Hemoglobin (POC) 12.2 12/03/2010 08:45 AM    HGB 14.5 05/14/2017 10:55 PM    Hematocrit (POC) 36 12/03/2010 08:45 AM    HCT 44.0 05/14/2017 10:55 PM    PLATELET 247 52/07/7568 10:55 PM    MCV 82.6 05/14/2017 10:55 PM     Lab Results   Component Value Date/Time    Sodium 136 05/14/2017 10:55 PM    Potassium 5.4 05/14/2017 10:55 PM    Chloride 101 05/14/2017 10:55 PM    CO2 25 05/14/2017 10:55 PM    Anion gap 10 05/14/2017 10:55 PM    Glucose 126 05/14/2017 10:55 PM    BUN 27 05/14/2017 10:55 PM    Creatinine 6.62 05/14/2017 10:55 PM    BUN/Creatinine ratio 4 05/14/2017 10:55 PM    GFR est AA 7 05/14/2017 10:55 PM    GFR est non-AA 6 05/14/2017 10:55 PM    Calcium 8.9 05/14/2017 10:55 PM    Bilirubin, total 0.7 05/14/2017 10:55 PM    AST (SGOT) 26 05/14/2017 10:55 PM    Alk.  phosphatase 141 05/14/2017 10:55 PM    Protein, total 7.3 05/14/2017 10:55 PM    Albumin 3.4 05/14/2017 10:55 PM    Globulin 3.9 05/14/2017 10:55 PM    A-G Ratio 0.9 05/14/2017 10:55 PM    ALT (SGPT) 15 05/14/2017 10:55 PM       Intake/Output Summary (Last 24 hours) at 05/15/17 1143  Last data filed at 05/15/17 0946   Gross per 24 hour   Intake           148.33 ml   Output                0 ml   Net           148.33 ml     Lab Results   Component Value Date/Time    Glucose 126 05/14/2017 10:55 PM    Glucose (POC) 71 05/15/2017 08:06 AM    Glucose (POC) 100 12/03/2010 08:45 AM     .    Current Facility-Administered Medications:     sodium chloride 0.9 % bolus infusion 100 mL, 100 mL, IntraVENous, ONCE, BUZZ Betancourt    enoxaparin (LOVENOX) injection 40 mg, 40 mg, SubCUTAneous, Q24H, China Pelaez MD, 40 mg at 05/15/17 0600    ondansetron (ZOFRAN) injection 1 mg, 1 mg, IntraVENous, Q6H PRN, Esperanza Lewis MD    aspirin (ASPIRIN) tablet 325 mg, 325 mg, Oral, DAILY, China Pelaez MD, 325 mg at 05/15/17 1014    atorvastatin (LIPITOR) tablet 80 mg, 80 mg, Oral, QHS, China Stinson MD    acetaminophen (TYLENOL) tablet 650 mg, 650 mg, Oral, Q4H PRN, Esperanza Lewis MD    acetaminophen (TYLENOL) suppository 650 mg, 650 mg, Rectal, Q4H PRN, China Pelaez MD    senna-docusate (PERICOLACE) 8.6-50 mg per tablet 2 Tab, 2 Tab, Oral, QHS, China Pelaez MD    pantoprazole (PROTONIX) 40 mg in sodium chloride 0.9 % 10 mL injection, 40 mg, IntraVENous, Q12H, China Pelaez MD, 40 mg at 05/15/17 1014    albuterol (PROVENTIL VENTOLIN) nebulizer solution 2.5 mg, 2.5 mg, Nebulization, Q4H PRN, China Pelaez MD    folic acid (FOLVITE) 1 mg, thiamine (B-1) 100 mg in 0.9% sodium chloride 50 mL ivpb, , IntraVENous, DAILY, China Pelaez MD    brinzolamide (AZOPT) 1 % ophthalmic suspension 1 Drop, 1 Drop, Both Eyes, BID, Artie Romo MD, 1 Drop at 05/15/17 1016    . PHARMACY TO SUBSTITUTE PER PROTOCOL, , , Per Protocol, Artie Romo MD    B complex-vitaminC-folic acid (NEPHROCAP) cap, 1 Cap, Oral, DAILY, China Pelaez MD    gabapentin (NEURONTIN) capsule 400 mg, 400 mg, Oral, TID, China Pelaez MD, 400 mg at 05/15/17 1014    sevelamer carbonate (RENVELA) tab 800 mg, 800 mg, Oral, TID, China Pelaez MD, 800 mg at 05/15/17 1013    PHARMACY INFORMATION NOTE, 1 Each, Other, ONCE, Artie Romo MD    0.9% sodium chloride infusion, 50 mL/hr, IntraVENous, DIALYSIS PRN, Brenda BURCH MD    albumin human 25% (BUMINATE) solution 12.5 g, 12.5 g, IntraVENous, DIALYSIS PRN, Brenda BURCH MD    heparin (porcine) injection 1,000 Units, 1,000 Units, IntraVENous, Q1H PRN, Brenda BURCH MD        Assessment:   Altered Mental status and Slurred speech (principle)  Diabetes  HTN  ESRD        Plan: Altered Mental status and Slurred speech  - continue TIA work up per neurology  -consult [facility]  - monitor for signs of stroke    Diabetes  -monitor blood sugar  -diabetc diet  - continue medication    HTN  -monitor bp  - increase in bp medication due to systolic of >523  -keep systolic below 715    ESRD  -continue dialysis  -possibly etiology of the altered mental status?  -monitor kidney function  - monitor electrolytes/ CMP          Jess Counter A [address]  5/15/2017  11:43 AM  *ATTENTION:  This note has been created by a medical student for educational purposes only. Please do not refer to the content of this note for clinical decision-making, billing, or other purposes.   Please see attending physicians note to obtain clinical information on this patient. *

## 2017-05-16 ENCOUNTER — APPOINTMENT (OUTPATIENT)
Dept: CT IMAGING | Age: 72
DRG: 064 | End: 2017-05-16
Attending: HOSPITALIST
Payer: MEDICARE

## 2017-05-16 PROBLEM — I63.9 ACUTE ISCHEMIC STROKE (HCC): Status: ACTIVE | Noted: 2017-05-16

## 2017-05-16 LAB
ALBUMIN SERPL BCP-MCNC: 2.8 G/DL (ref 3.4–5)
ALBUMIN/GLOB SERPL: 0.9 {RATIO} (ref 0.8–1.7)
ALP SERPL-CCNC: 114 U/L (ref 45–117)
ALT SERPL-CCNC: 12 U/L (ref 13–56)
ANION GAP BLD CALC-SCNC: 8 MMOL/L (ref 3–18)
AST SERPL W P-5'-P-CCNC: 19 U/L (ref 15–37)
ATRIAL RATE: 79 BPM
BASOPHILS # BLD AUTO: 0 K/UL (ref 0–0.06)
BASOPHILS # BLD: 0 % (ref 0–2)
BILIRUB SERPL-MCNC: 0.6 MG/DL (ref 0.2–1)
BUN SERPL-MCNC: 14 MG/DL (ref 7–18)
BUN/CREAT SERPL: 3 (ref 12–20)
CALCIUM SERPL-MCNC: 8.2 MG/DL (ref 8.5–10.1)
CALCULATED P AXIS, ECG09: 62 DEGREES
CALCULATED R AXIS, ECG10: 6 DEGREES
CALCULATED T AXIS, ECG11: 25 DEGREES
CHLORIDE SERPL-SCNC: 99 MMOL/L (ref 100–108)
CO2 SERPL-SCNC: 29 MMOL/L (ref 21–32)
CREAT SERPL-MCNC: 4.29 MG/DL (ref 0.6–1.3)
CRP SERPL HS-MCNC: 1.81 MG/L (ref 0–3)
DIAGNOSIS, 93000: NORMAL
DIFFERENTIAL METHOD BLD: ABNORMAL
EOSINOPHIL # BLD: 0.3 K/UL (ref 0–0.4)
EOSINOPHIL NFR BLD: 5 % (ref 0–5)
ERYTHROCYTE [DISTWIDTH] IN BLOOD BY AUTOMATED COUNT: 17.6 % (ref 11.6–14.5)
GLOBULIN SER CALC-MCNC: 3.1 G/DL (ref 2–4)
GLUCOSE BLD STRIP.AUTO-MCNC: 103 MG/DL (ref 70–110)
GLUCOSE BLD STRIP.AUTO-MCNC: 62 MG/DL (ref 70–110)
GLUCOSE SERPL-MCNC: 56 MG/DL (ref 74–99)
HCT VFR BLD AUTO: 39.4 % (ref 35–45)
HGB BLD-MCNC: 12.8 G/DL (ref 12–16)
LYMPHOCYTES # BLD AUTO: 21 % (ref 21–52)
LYMPHOCYTES # BLD: 1.1 K/UL (ref 0.9–3.6)
MCH RBC QN AUTO: 26.5 PG (ref 24–34)
MCHC RBC AUTO-ENTMCNC: 32.5 G/DL (ref 31–37)
MCV RBC AUTO: 81.6 FL (ref 74–97)
MONOCYTES # BLD: 0.6 K/UL (ref 0.05–1.2)
MONOCYTES NFR BLD AUTO: 12 % (ref 3–10)
NEUTS SEG # BLD: 3.2 K/UL (ref 1.8–8)
NEUTS SEG NFR BLD AUTO: 62 % (ref 40–73)
P-R INTERVAL, ECG05: 162 MS
PLATELET # BLD AUTO: 120 K/UL (ref 135–420)
PMV BLD AUTO: 10.7 FL (ref 9.2–11.8)
POTASSIUM SERPL-SCNC: 4.1 MMOL/L (ref 3.5–5.5)
PROT SERPL-MCNC: 5.9 G/DL (ref 6.4–8.2)
Q-T INTERVAL, ECG07: 420 MS
QRS DURATION, ECG06: 90 MS
QTC CALCULATION (BEZET), ECG08: 481 MS
RBC # BLD AUTO: 4.83 M/UL (ref 4.2–5.3)
SODIUM SERPL-SCNC: 136 MMOL/L (ref 136–145)
VENTRICULAR RATE, ECG03: 79 BPM
WBC # BLD AUTO: 5.3 K/UL (ref 4.6–13.2)

## 2017-05-16 PROCEDURE — G8979 MOBILITY GOAL STATUS: HCPCS

## 2017-05-16 PROCEDURE — 74011000250 HC RX REV CODE- 250: Performed by: FAMILY MEDICINE

## 2017-05-16 PROCEDURE — 80053 COMPREHEN METABOLIC PANEL: CPT | Performed by: HOSPITALIST

## 2017-05-16 PROCEDURE — G8978 MOBILITY CURRENT STATUS: HCPCS

## 2017-05-16 PROCEDURE — 36415 COLL VENOUS BLD VENIPUNCTURE: CPT | Performed by: HOSPITALIST

## 2017-05-16 PROCEDURE — 65660000000 HC RM CCU STEPDOWN

## 2017-05-16 PROCEDURE — 74011250637 HC RX REV CODE- 250/637: Performed by: FAMILY MEDICINE

## 2017-05-16 PROCEDURE — 85025 COMPLETE CBC W/AUTO DIFF WBC: CPT | Performed by: HOSPITALIST

## 2017-05-16 PROCEDURE — 97530 THERAPEUTIC ACTIVITIES: CPT

## 2017-05-16 PROCEDURE — 74011000258 HC RX REV CODE- 258: Performed by: FAMILY MEDICINE

## 2017-05-16 PROCEDURE — 74011250636 HC RX REV CODE- 250/636: Performed by: NURSE PRACTITIONER

## 2017-05-16 PROCEDURE — 82962 GLUCOSE BLOOD TEST: CPT

## 2017-05-16 PROCEDURE — 74011250636 HC RX REV CODE- 250/636: Performed by: FAMILY MEDICINE

## 2017-05-16 PROCEDURE — 97535 SELF CARE MNGMENT TRAINING: CPT

## 2017-05-16 PROCEDURE — 92526 ORAL FUNCTION THERAPY: CPT

## 2017-05-16 PROCEDURE — C9113 INJ PANTOPRAZOLE SODIUM, VIA: HCPCS | Performed by: FAMILY MEDICINE

## 2017-05-16 PROCEDURE — 97162 PT EVAL MOD COMPLEX 30 MIN: CPT

## 2017-05-16 PROCEDURE — 74011250637 HC RX REV CODE- 250/637: Performed by: INTERNAL MEDICINE

## 2017-05-16 RX ORDER — LISINOPRIL 20 MG/1
20 TABLET ORAL DAILY
Status: DISCONTINUED | OUTPATIENT
Start: 2017-05-16 | End: 2017-05-17

## 2017-05-16 RX ADMIN — ATORVASTATIN CALCIUM 80 MG: 40 TABLET, FILM COATED ORAL at 23:38

## 2017-05-16 RX ADMIN — ASCORBIC ACID, FOLIC ACID, NIACIN, THIAMINE, RIBOFLAVIN, PYRIDOXINE, CYANOCOBALAMIN, PANTOTHENIC ACID, BIOTIN 1 CAPSULE: 100; 150; 6; 1; 20; 5; 10; 1.7; 1.5 CAPSULE, LIQUID FILLED ORAL at 09:00

## 2017-05-16 RX ADMIN — BRIMONIDINE TARTRATE 1 DROP: 2 SOLUTION/ DROPS OPHTHALMIC at 09:38

## 2017-05-16 RX ADMIN — SEVELAMER CARBONATE 800 MG: 800 TABLET, FILM COATED ORAL at 16:56

## 2017-05-16 RX ADMIN — SEVELAMER CARBONATE 800 MG: 800 TABLET, FILM COATED ORAL at 23:38

## 2017-05-16 RX ADMIN — LISINOPRIL 20 MG: 20 TABLET ORAL at 11:05

## 2017-05-16 RX ADMIN — ASPIRIN 325 MG ORAL TABLET 325 MG: 325 PILL ORAL at 09:34

## 2017-05-16 RX ADMIN — BRINZOLAMIDE 1 DROP: 10 SUSPENSION/ DROPS OPHTHALMIC at 09:38

## 2017-05-16 RX ADMIN — PRAVASTATIN SODIUM 2 TABLET: 20 TABLET ORAL at 23:38

## 2017-05-16 RX ADMIN — ENOXAPARIN SODIUM 30 MG: 40 INJECTION SUBCUTANEOUS at 06:23

## 2017-05-16 RX ADMIN — SEVELAMER CARBONATE 800 MG: 800 TABLET, FILM COATED ORAL at 09:34

## 2017-05-16 RX ADMIN — FOLIC ACID: 5 INJECTION, SOLUTION INTRAMUSCULAR; INTRAVENOUS; SUBCUTANEOUS at 16:56

## 2017-05-16 RX ADMIN — SODIUM CHLORIDE 40 MG: 9 INJECTION INTRAMUSCULAR; INTRAVENOUS; SUBCUTANEOUS at 23:41

## 2017-05-16 RX ADMIN — BRIMONIDINE TARTRATE 1 DROP: 2 SOLUTION/ DROPS OPHTHALMIC at 17:01

## 2017-05-16 RX ADMIN — SODIUM CHLORIDE 40 MG: 9 INJECTION INTRAMUSCULAR; INTRAVENOUS; SUBCUTANEOUS at 09:35

## 2017-05-16 RX ADMIN — BRINZOLAMIDE 1 DROP: 10 SUSPENSION/ DROPS OPHTHALMIC at 17:01

## 2017-05-16 RX ADMIN — TIMOLOL MALEATE 1 DROP: 5 SOLUTION OPHTHALMIC at 09:38

## 2017-05-16 RX ADMIN — TIMOLOL MALEATE 1 DROP: 5 SOLUTION OPHTHALMIC at 17:01

## 2017-05-16 RX ADMIN — GABAPENTIN 400 MG: 400 CAPSULE ORAL at 09:35

## 2017-05-16 NOTE — PROGRESS NOTES
Problem: Mobility Impaired (Adult and Pediatric)  Goal: *Acute Goals and Plan of Care (Insert Text)  PHYSICAL THERAPY SHORT TERM GOALS :   1. Patient will perform/completeroll to both sides with supervision/set-up within 1 week(s). 2. Patient will perform/completesupine to sitting with minimal assistance/contact guard assist within 1 week(s). 3. Patient will perform/completebed to chair with minimal assistance/contact guard assist within 1 week(s). 4. Patient will perform/complete propel wheelchair for 50 feet to egress bus for dialysis with minimal assistance/contact guard assist within 1 week(s). 5. Patient will participate in lower extremity therapeutic exercise/activities with minimal assistance/contact guard assist for 8 minutes within 1 week(s). Therapist Luis A Albarran, PT 5/16/2017  Time Calculation: 26 mins  Outcome: Progressing Towards Goal  PHYSICAL THERAPY EVALUATION     Patient: Jim Rose (89 y.o. female)  Date: 5/16/2017  Primary Diagnosis: Encephalopathy        Precautions:   Fall      PROBLEM LIST:  Patient presents with the following problems:   Bed Mobility, Transfers, Gait, Strength, Range of Motion, Balance, Home Exercise Program and Precautions  ASSESSMENT :   Patient requires between total assistance and moderate assistance  for bed mobility, transfers  Patient unable to come to full standing at bedside unable to fully ext hips and knees. Patient had word finding  Problems but understood what was said to her. Patient did not report any pain .  education on plan of care and ankle pumps to do in bed as well as positioning in chair position in bed needs reinforcement. Patient will benefit from skilled intervention to address the above impairments.   Patients rehabilitation potential is considered to be Fair  Factors which may influence rehabilitation potential include:   [ ]         None noted  [ ]         Mental ability/status  [X]         Medical condition  [ ] Home/family situation and support systems  [ ]         Safety awareness  [ ]         Pain tolerance/management  [ ]         Other:        PLAN :  Recommendations and Planned Interventions:  [X]           Bed Mobility Training             [X]    Neuromuscular Re-Education  [X]           Transfer Training                   [ ]    Orthotic/Prosthetic Training  [X]           Gait Training                          [ ]    Modalities  [X]           Therapeutic Exercises          [ ]    Edema Management/Control  [X]           Therapeutic Activities            [X]    Patient and Family Training/Education  [ ]           Other (comment):     Frequency/Duration: Patient will be followed by physical therapy 1-2 times per day/4-7 days per week to address goals. Discharge Recommendations: Rehab and Skilled Nursing Facility  Further Equipment Recommendations for Discharge: tbd priro to going home has wheelchair. SUBJECTIVE:   Patient stated .      OBJECTIVE DATA SUMMARY:       Past Medical History:   Diagnosis Date    Anemia NEC      CAD (coronary artery disease)      Congestive heart failure, unspecified      Diabetes ([facility] [address]      Dyslipidemia      ESRD (end stage renal disease) on dialysis ([address]       [place] [facility] [place].      GERD (gastroesophageal reflux disease)      Glaucoma      HTN (hypertension)      Kidney failure      LVH (left ventricular hypertrophy)      Neuropathy       Past Surgical History:   Procedure Laterality Date    HX BREAST BIOPSY   03/24/2010     excision left breast biopsy    HX HYSTERECTOMY        HX OTHER SURGICAL   12/3/10     I&D w/Pulsavac & wound vac application, left heel    HX OTHER SURGICAL   12/6/10     Debridement, left heel w/reduction of inferior calcaneal spur, left heel    HX POLYPECTOMY         Barriers to Learning/Limitations: yes;  sensory deficits-vision/hearing/speech and physical  Compensate with: visual, verbal, tactile, kinesthetic cues/model     G CODES:Mobility [id number] Current  CK= 40-59%   Goal  CI= 1-19%. The severity rating is based on the Other [employer] Sitting Balance Scale3/5   [employer] Sitting Balance Scale3/5  0: Pt performs 25% or less of sitting activity (Max assist) CN, 100% impaired. 1: Pt supports self with upper extremities but requires therapist assistance. Pt performs 25-50% of effort (Mod assist) CM, 80% to <100% impaired. 1+: Pt supports self with upper extremities but requires therapist assistance. Pt performs >50% effort. (Min assist). CL, 60% to <80% impaired. 2: Pt supports self independently with both upper extremities. CL, 60% to <80% impaired. 2+: Pt support self independently with 1 upper extremity. CK, 40% to <60% impaired. 3: Pt sits without upper extremity support for up to 30 seconds. CK, 40% to <60% impaired. 3+: Pt sits without upper extremity support for 30 seconds or greater. CJ, 20% to <40% impaired. 4: Pt moves and returns trunkal midpoint 1-2 inches in one plane. CJ, 20% to <40% impaired. 4+: Pt moves and returns trunkal midpoint 1-2 inches in multiple planes. CI, 1% to <20% impaired. 5: Pt moves and returns trunkal midpoint in all planes greater than 2 inches. CH, 0% impaired.         Eval Complexity: History: HIGH Complexity :3+ comorbidities / personal factors will impact the outcome/ POC Exam:MEDIUM Complexity : 3 Standardized tests and measures addressing body structure, function, activity limitation and / or participation in recreation  Presentation: MEDIUM Complexity : Evolving with changing characteristics  Clinical Decision Making:Medium Complexity [facility] Sitting Balance Scale3/5 Overall Complexity:MEDIUM     Prior Level of Function/Home Situation:  Transfers to wheelchair for community mobility and using cane in home per patient  Home Situation  Home Environment: Private residence  # Steps to Enter: 1  One/Two Story Residence: Two story, live on 1st floor  Living Alone: No  Support Systems: Family member(s)  Patient Expects to be Discharged to[de-identified] Private residence  Current DME Used/Available at Home: Wheelchair, Shower chair  Tub or Shower Type: Tub/Shower combination  Critical Behavior:  Neurologic State: Alert  Orientation Level: Oriented to person;Oriented to place; Disoriented to time;Disoriented to situation  Cognition: Follows commands  Safety/Judgement: Fall prevention  Psychosocial  Patient Behaviors: Calm; Cooperative  Purposeful Interaction: Yes  Pt Identified Daily Priority: Clinical issues (comment); Social issues (comment)  Caritas Process: Nurture spiritual self;Establish trust  Caring Interventions: Reassure  Skin Condition/Temp: Warm;Dry     Skin Integrity: Intact  Skin Integumentary  Skin Color: Appropriate for ethnicity  Skin Condition/Temp: Warm;Dry  Skin Integrity: Intact  Turgor: Epidermis thin w/ loss of subcut tissue  Hair Growth: Present  Varicosities: Absent     Strength:    Strength: Generally decreased, functional (2+/5 to 3-/5 in both legs. )  Tone & Sensation:   Tone: Normal  Sensation: Intact     Range Of Motion:  AROM: Generally decreased, functional  PROM: Generally decreased, functional (limitedd hip and knee ext)  Functional Mobility:  Bed Mobility:  Rolling: Moderate assistance;Minimum assistance  Supine to Sit: Moderate assistance;Assist x1;Assist x2; Additional time  Sit to Supine: Moderate assistance; Additional time;Assist x2  Scooting: Moderate assistance; Additional time;Assist x2  Transfers:  Sit to Stand: Maximum assistance; Total assistance;Assist x2; Additional time  Stand to Sit: Maximum assistance; Total assistance;Assist x2; Additional time  Balance:   Sitting: Impaired ( unable to test )  Sitting - Static: Fair (occasional)  Sitting - Dynamic: Fair (occasional)  Standing: Impaired  Standing - Static: Poor  Standing - Dynamic : None  Ambulation/Gait Training:  Gait Description (WDL): Exceptions to [facility]  Therapeutic Exercises: Laq's, hip flex, ankle pumps, backext with shoulder retraction. Pain:  Pre treatment pain level:0  Post treatment pain level:0  Pain Scale 1: Numeric (0 - 10)  Pain Intensity 1: 0  Activity Tolerance:   Fair   Please refer to the flowsheet for vital signs taken during this treatment. After treatment:   [ ]         Patient left in no apparent distress sitting up in chair  [X]         Patient left in no apparent distress in bed chair position  [X]         Call bell left within reach  [X]         Nursing notified  [ ]         Caregiver present  [ ]         Bed alarm activated      COMMUNICATION/EDUCATION:   [X]         Fall prevention education was provided and the patient/caregiver indicated understanding. [X]         Patient/family have participated as able in goal setting and plan of care. [X]         Patient/family agree to work toward stated goals and plan of care. [ ]         Patient understands intent and goals of therapy, but is neutral about his/her participation. [ ]         Patient is unable to participate in goal setting and plan of care. Patient educated on the role of physical therapy during the acute stay  and the importance of mobility. VU. Needs reinforcement.       Thank you for this referral.  Kumar Whitehead, PT   Time Calculation: 26 mins

## 2017-05-16 NOTE — PROGRESS NOTES
2004  Received bedside verbal shift report. Pt sitting up in bed family at bedside. piv # 20 to left arm intact. nsr on telemetry box # 62. Call bell within reach side rails up x 2, bed low and locked. No complaints offered, pt instructed to call for assistance. NIH completed at this time. 2055  Repeat EKG completed, NSR    2225  Pt off unit to dialysis. 0150  Pt back to unit from dialysis. 0530  Blood sugar 63 snack given    0625 blood sugar recheck 103    0715 Bedside and Verbal shift change report given to nsr josé luis gomes rn (oncoming nurse) by juwan zheng rn  (offgoing nurse). Report included the following information SBAR, Kardex, Intake/Output, MAR, Recent Results and Med Rec Status.

## 2017-05-16 NOTE — CONSULTS
Neurology progress Note          Admit Date: 5/14/2017  Length of Stay: 1  Primary Care: Barbara Solano MD   Principle Problem: <principal problem not specified>     Assessment/ plan   1. Stroke: acutely involving the left insular cortex   Currently on ASA for antiplatelet therapy. .  Atorvastatin 80mg . Lovenox for VTE. Cardiac monitoring  for stroke work-up. Manage BP per stroke protocol. Avoid lowering BP during dialysis. Lisinopril currently on hold. 2. ESRD: on HD per renal.           History: Tammy Carrion is a 75 yo AA female with PMH of DM, HTN, stroke, ESRD, and arthritis who presented to the ED with difficulty expressing herself. She feels better today and has no new focal deficit. MRA today showed Moderate appearing approximately 60% diameter stenosis proximal left ICA. Brain MRA was unremarkable. Results reviewed:     CT Results (most recent):    Results from Hospital Encounter encounter on 05/14/17   CT HEAD WO CONT   Narrative EXAM: CT head without IV contrast    INDICATION: Suspected stroke. COMPARISON: None. TECHNIQUE: CT imaging of the head was obtained from skull base to vertex without  intravenous contrast.    One or more dose reduction techniques were used on this CT: automated exposure  control, adjustment of the mAs and/or kVp according to patient's size, and  iterative reconstruction techniques. The specific techniques utilized on this CT  exam have been documented in the patient's electronic medical record.    _______________    FINDINGS:    BRAIN AND POSTERIOR FOSSA: There is diffuse prominence of the ventricular  system, associated with proportional widening of the cortical cerebral sulci,  compatible with generalized volume loss. There is a well-circumscribed  hypodensity within the left thalamus. There is no intracranial hemorrhage, mass  effect, or shift of midline structures.   Scattered periventricular and  subcortical hypoattenuation which is a nonspecific finding, most commonly  encountered in the setting of chronic microvascular disease. EXTRA-AXIAL SPACES AND MENINGES: There are no abnormal extra-axial fluid  collections    CALVARIUM: No acute osseous abnormality. SINUSES: The visualized portions of the paranasal sinuses and mastoid air cells  are well aerated. OTHER: There is intracranial atherosclerosis. _______________         Impression IMPRESSION:      1. Age-indeterminate left thalamic infarct. 2.  Moderate cerebral atrophy/volume loss and periventricular white matter  changes, most commonly seen with chronic microvascular disease. Somewhat more  confluent white matter hypodensity within the left centrum semiovale could  represent an age indeterminate infarct. 3. No acute intracranial hemorrhage. MRI Results (most recent):    Results from [place] encounter on 05/14/17   MRA NECK WO CONT   Narrative History: Slurred speech, facial droop, right leg weakness, CVA    PROCEDURE: 2-D and 3-D time-of-flight noncontrast MRA neck performed with MIP  reconstructions obtained. FINDINGS: The visualized mid and distal cervical portions of vertebral arteries  and proximal visualized intradural vertebral arteries appear diffusely small but  patent. No evidence of significant distal common carotid artery stenosis, not  fully included proximally. No evidence of significant proximal right ICA  stenosis. There is eccentric plaque proximal left ICA narrowing the lumen, with  approximate residual luminal diameter 1.4 mm compared to normal caliber cervical  ICA 3.5 mm representing 60% diameter stenosis by NASCET criteria. Impression IMPRESSION:    1. Somewhat limited evaluation due to noncontrast study. 2. Moderate appearing approximately 60% diameter stenosis proximal left ICA. Correlation to noninvasive carotid Doppler study recommended. 3. No other significant carotid or vertebral stenosis. Latest Hemoglobin A1C:  Lab Results   Component Value Date/Time    Hemoglobin A1c 5.9 05/15/2017 08:35 AM       Latest Cardiology Procedure:  Results for orders placed or performed during the hospital encounter of 05/14/17   EKG, 12 LEAD, INITIAL   Result Value Ref Range    Ventricular Rate 79 BPM    Atrial Rate 79 BPM    P-R Interval 162 ms    QRS Duration 90 ms    Q-T Interval 420 ms    QTC Calculation (Bezet) 481 ms    Calculated P Axis 62 degrees    Calculated R Axis 6 degrees    Calculated T Axis 25 degrees    Diagnosis       Normal sinus rhythm  Normal ECG  When compared with ECG of 15-JULIANE-2016 10:56,  Questionable change in QRS axis  Confirmed by Bandar Faye ([id number]) on 5/16/2017 7:52:21 AM         Important Labs:  No results found for: FOL, RBCF  Lab Results   Component Value Date/Time    Cholesterol, total 98 05/15/2017 08:35 AM    HDL Cholesterol 60 05/15/2017 08:35 AM    LDL, calculated 26.4 05/15/2017 08:35 AM    VLDL, calculated 11.6 05/15/2017 08:35 AM    Triglyceride 58 05/15/2017 08:35 AM    CHOL/HDL Ratio 1.6 05/15/2017 08:35 AM     Lab Results   Component Value Date/Time    TSH 0.85 05/15/2017 08:35 AM    Triiodothyronine (T3), free 2.1 05/15/2017 08:35 AM    T4, Free 0.9 05/15/2017 08:35 AM     No results found for: DS35, PHEN, PHENO, PHENT, DILF, DS39, PHENY, PTN, VALF2, VALAC, VALP, VALPR, DS6, CRBAM, CRBAMP, CARB2, XCRBAM  Discussed with: patient and nurse      PMH:   Past Medical History:   Diagnosis Date    Anemia NEC     CAD (coronary artery disease)     Congestive heart failure, unspecified     Diabetes ([facility] [address]     Dyslipidemia     ESRD (end stage renal disease) on dialysis ([facility] [address]     mwf, [facility], Dr. Katie Velazco.      GERD (gastroesophageal reflux disease)     Glaucoma     HTN (hypertension)     Kidney failure     LVH (left ventricular hypertrophy)     Neuropathy         Problem List: Active Problems:    Encephalopathy (5/15/2017)        FH:   Family History Problem Relation Age of Onset    Hypertension Other     Heart Disease Other     Diabetes Other     Diabetes Other     Heart Disease Other     Hypertension Other         SH:   Social History     Social History    Marital status:      Spouse name: N/A    Number of children: N/A    Years of education: N/A     Social History Main Topics    Smoking status: Never Smoker    Smokeless tobacco: Never Used    Alcohol use No    Drug use: No    Sexual activity: Not Asked     Other Topics Concern    None     Social History Narrative          Vital Signs:   Visit Vitals    /70    Pulse 81    Temp 98.6 °F (37 °C)    Resp 20    Wt 60 kg (132 lb 3.2 oz)    SpO2 100%    BMI 20.71 kg/m2      Neurological examination:    Appearance: In no acute distress, well developed, well nourished, cooperative   Cardiovascular:  AV fistula RUE    Mental status examination: Alert and oriented X 3. No dysarthria and slight dysphasia. .   Cranial Nerves:     I: smell Not tested   II: visual fields    II: pupils Equal, round, reactive to light   III,VII: ptosis none   III,IV,VI: extraocular muscles  Full ROM   V: facial light touch sensation  normal   V,VII: corneal reflex     VII: facial muscle function  normal   VIII: hearing    IX: soft palate elevation     IX,X: gag reflex    XI: sternocleidomastoid strength    XII: tongue strength  normal        Motor exam: Station, gait:  deferred. No focal weakness in the limbs. No trouble lift up arms and able to briefly raise up her legs.  Sensory: Intact to LT bilaterally .        Coordination:  finger to nose testing is normal          Medications:      [x] REVIEWED  Current Facility-Administered Medications   Medication    lisinopril (PRINIVIL, ZESTRIL) tablet 20 mg    ondansetron (ZOFRAN) injection 1 mg    aspirin (ASPIRIN) tablet 325 mg    atorvastatin (LIPITOR) tablet 80 mg    acetaminophen (TYLENOL) tablet 650 mg    acetaminophen (TYLENOL) suppository 650 mg    senna-docusate (PERICOLACE) 8.6-50 mg per tablet 2 Tab    pantoprazole (PROTONIX) 40 mg in sodium chloride 0.9 % 10 mL injection    albuterol (PROVENTIL VENTOLIN) nebulizer solution 2.5 mg    folic acid (FOLVITE) 1 mg, thiamine (B-1) 100 mg in 0.9% sodium chloride 50 mL ivpb    brinzolamide (AZOPT) 1 % ophthalmic suspension 1 Drop    brimonidine (ALPHAGAN) 0.2 % ophthalmic solution 1 Drop    B complex-vitaminC-folic acid (NEPHROCAP) cap    sevelamer carbonate (RENVELA) tab 800 mg    0.9% sodium chloride infusion    albumin human 25% (BUMINATE) solution 12.5 g    timolol (TIMOPTIC) 0.5 % ophthalmic solution 1 Drop    enoxaparin (LOVENOX) injection 30 mg    gabapentin (NEURONTIN) capsule 400 mg     Data:      [x] REVIEWED  Recent Results (from the past 24 hour(s))   EKG, 12 LEAD, INITIAL    Collection Time: 05/15/17  8:55 PM   Result Value Ref Range    Ventricular Rate 79 BPM    Atrial Rate 79 BPM    P-R Interval 162 ms    QRS Duration 90 ms    Q-T Interval 420 ms    QTC Calculation (Bezet) 481 ms    Calculated P Axis 62 degrees    Calculated R Axis 6 degrees    Calculated T Axis 25 degrees    Diagnosis       Normal sinus rhythm  Normal ECG  When compared with ECG of 15-JULIANE-2016 10:56,  Questionable change in QRS axis  Confirmed by Zuri Quintero ([id number]) on 5/16/2017 7:52:21 AM     CBC WITH AUTOMATED DIFF    Collection Time: 05/16/17  5:10 AM   Result Value Ref Range    WBC 5.3 4.6 - 13.2 K/uL    RBC 4.83 4.20 - 5.30 M/uL    HGB 12.8 12.0 - 16.0 g/dL    HCT 39.4 35.0 - 45.0 %    MCV 81.6 74.0 - 97.0 FL    MCH 26.5 24.0 - 34.0 PG    MCHC 32.5 31.0 - 37.0 g/dL    RDW 17.6 (H) 11.6 - 14.5 %    PLATELET 346 (L) 312 - 420 K/uL    MPV 10.7 9.2 - 11.8 FL    NEUTROPHILS 62 40 - 73 %    LYMPHOCYTES 21 21 - 52 %    MONOCYTES 12 (H) 3 - 10 %    EOSINOPHILS 5 0 - 5 %    BASOPHILS 0 0 - 2 %    ABS. NEUTROPHILS 3.2 1.8 - 8.0 K/UL    ABS. LYMPHOCYTES 1.1 0.9 - 3.6 K/UL    ABS.  MONOCYTES 0.6 0.05 - 1.2 K/UL ABS. EOSINOPHILS 0.3 0.0 - 0.4 K/UL    ABS. BASOPHILS 0.0 0.0 - 0.06 K/UL    DF AUTOMATED     METABOLIC PANEL, COMPREHENSIVE    Collection Time: 05/16/17  5:10 AM   Result Value Ref Range    Sodium 136 136 - 145 mmol/L    Potassium 4.1 3.5 - 5.5 mmol/L    Chloride 99 (L) 100 - 108 mmol/L    CO2 29 21 - 32 mmol/L    Anion gap 8 3.0 - 18 mmol/L    Glucose 56 (L) 74 - 99 mg/dL    BUN 14 7.0 - 18 MG/DL    Creatinine 4.29 (H) 0.6 - 1.3 MG/DL    BUN/Creatinine ratio 3 (L) 12 - 20      GFR est AA 12 (L) >60 ml/min/1.73m2    GFR est non-AA 10 (L) >60 ml/min/1.73m2    Calcium 8.2 (L) 8.5 - 10.1 MG/DL    Bilirubin, total 0.6 0.2 - 1.0 MG/DL    ALT (SGPT) 12 (L) 13 - 56 U/L    AST (SGOT) 19 15 - 37 U/L    Alk.  phosphatase 114 45 - 117 U/L    Protein, total 5.9 (L) 6.4 - 8.2 g/dL    Albumin 2.8 (L) 3.4 - 5.0 g/dL    Globulin 3.1 2.0 - 4.0 g/dL    A-G Ratio 0.9 0.8 - 1.7     GLUCOSE, POC    Collection Time: 05/16/17  5:31 AM   Result Value Ref Range    Glucose (POC) 62 (L) 70 - 110 mg/dL   GLUCOSE, POC    Collection Time: 05/16/17  6:21 AM   Result Value Ref Range    Glucose (POC) 103 70 - 110 mg/dL       Susana Loera MD

## 2017-05-16 NOTE — DIALYSIS
ACUTE HEMODIALYSIS FLOW SHEET    HEMODIALYSIS ORDERS: Physician:Dr Meneses      Dialyzer: Revaclear          Duration: 3.0 hr  BFR: 350   DFR: 800   Dialysate:  Temp 37.0  K+ 2.0    Ca+ 2.5  Na 140 Bicarb 35   Weight: 64.9  kg    Bed Scale [x]     Unable to Obtain []        Dry weight/UF Goal: 1500 mL   Access RUE AVG  Needle Gauge 15 gauge 1 inch     Heparin []  Bolus      Units    [] Hourly       Units    [x]None     Catheter locking solution n/a    Pre BP: 158/45    Pulse:  76   Temperature: 98.1   Respirations:   Tx: NS       ml/Bolus  Other        [] N/A   Labs: Pre        Post:        [x] N/A   Additional Orders(medications, blood products, hypotension management):       [x] N/A     [x] Time Out/Safety Check  [x] [facility] Consent Verified     CATHETER ACCESS: [x]N/A   []Right   []Left   []IJ     []Fem   [] First use X-ray verified     []Tunnel                [] Non Tunneled   []No S/S infection  []Redness  []Drainage []Cultured []Swelling []Pain   []Medical Aseptic Prep Utilized   []Dressing Changed  [] Biopatch  Date:       []Clotted   []Patent   Flows: []Good  []Poor  []Reversed   If access problem,  notified: []Yes    [x]N/A  Date:           GRAFT/FISTULA ACCESS:  []N/A     [x]Right     []Left     [x]UE     []LE   [x]AVG   []AVF        []Buttonhole    [x]Medical Aseptic Prep Utilized   [x]No S/S infection  []Redness  []Drainage []Cultured []Swelling []Pain    Bruit:   [x] Strong    [] Weak       Thrill :   [x] Strong    [] Weak       Needle Gauge: 15   Length: 1   If access problem,  notified: []Yes     [x]N/A  Date:        Please describe access if present and not used:       GENERAL ASSESSMENT:    LUNGS:  Rate 18 SaO2%        [] N/A    [] Clear  [] Coarse  [] Crackles  [] Wheezing        [] Diminished     Location : []RLL   []LLL    []RUL  []MAGDIEL   Cough: []Productive  []Dry  [x]N/A   Respirations:  [x]Easy  []Labored   Therapy:  [x]RA  []NC  l/min    Mask: []NRB []Venti       O2% []Ventilator  []Intubated  [] Trach  [] BiPaP   CARDIAC: []Regular      [] Irregular   [] Pericardial Rub  [] JVD        [x]  Monitored  [] Bedside  [] Remotely monitored [] N/A  Rhythm:    EDEMA: [x] None  []Generalized  [] Pitting [] 1    [] 2    [] 3    [] 4                 [] Facial  [] Pedal  []  UE  [] LE   SKIN:   [x] Warm  [] Hot     [] Cold   [] Dry     [] Pale   [] Diaphoretic                  [] Flushed  [] Jaundiced  [] Cyanotic  [] Rash  [] Weeping   LOC:    [x] Alert      [x]Oriented:    [x] Person     [x] Place  [x]Time               [] Confused  [] Lethargic  [] Medicated  [] Non-responsive     GI / ABDOMEN:  [] Flat    [] Distended    [x] Soft    [] Firm   []  Obese                             [] Diarrhea  [x] Bowel Sounds  [] Nausea  [] Vomiting       / URINE ASSESSMENT:[] Voiding   [] Oliguria  [x] Anuria   []  Galindo     [] Incontinent    []  Incontinent Brief      []  Bathroom Privileges     PAIN: [x] 0 []1  []2   []3   []4   []5   []6   []7   []8   []9   []10            Scale 0-10  Action/Follow Up:    MOBILITY:  [] Amb    [] Amb/Assist    [x] Bed    [] Wheelchair  [] Stretcher      All Vitals and Treatment Details on Attached [address]: [facility]          Room #9713     [] 1st Time Acute  [] Stat  [x] Routine  [] Urgent     [x] Acute Room  []  Bedside  [] ICU/CCU  [] ER   Isolation Precautions:  [x] Dialysis   [] Airborne   [] Contact    [] Reverse   Special Considerations:         [] Blood Consent Verified [x]N/A     ALLERGIES:   [x] NKA          Code Status:  [x] Full Code  [] DNR  [] Other           HBsAg ONLY: Date Drawn 4/19/17        [x]Negative []Positive []Unknown   HBsAb: Date 01/18/17   [] Susceptible   [x] Gpeous49 []Not Drawn  [] Drawn  Results received [facility] dialysis center [facility]      Current Labs:    Date of Labs: Today [x]     Results for Sly Newton (MRN [id number]) as of 5/15/2017 23:29   Ref.  Range 5/14/2017 22:55   WBC Latest Ref Range: 4.6 - 13.2 K/uL 4.9   RBC Latest Ref Range: 4.20 - 5.30 M/uL 5.33 (H)   HGB Latest Ref Range: 12.0 - 16.0 g/dL 14.5   HCT Latest Ref Range: 35.0 - 45.0 % 44.0   MCV Latest Ref Range: 74.0 - 97.0 FL 82.6   MCH Latest Ref Range: 24.0 - 34.0 PG 27.2   MCHC Latest Ref Range: 31.0 - 37.0 g/dL 33.0   RDW Latest Ref Range: 11.6 - 14.5 % 17.6 (H)   PLATELET Latest Ref Range: 135 - 420 K/uL 150   MPV Latest Ref Range: 9.2 - 11.8 FL 10.9   INR Latest Ref Range: 0.8 - 1.2   1.0   Prothrombin time Latest Ref Range: 11.5 - 15.2 sec 12.7   Fibrinogen Latest Ref Range: 210 - 451 mg/dL 435   Thrombin time Latest Ref Range: 13.8 - 18.2 SECS 17.8   Sodium Latest Ref Range: 136 - 145 mmol/L 136   Potassium Latest Ref Range: 3.5 - 5.5 mmol/L 5.4   Chloride Latest Ref Range: 100 - 108 mmol/L 101   CO2 Latest Ref Range: 21 - 32 mmol/L 25   Anion gap Latest Ref Range: 3.0 - 18 mmol/L 10   Glucose Latest Ref Range: 74 - 99 mg/dL 126 (H)   BUN Latest Ref Range: 7.0 - 18 MG/DL 27 (H)   Creatinine Latest Ref Range: 0.6 - 1.3 MG/DL 6.62 (H)   BUN/Creatinine ratio Latest Ref Range: 12 - 20   4 (L)   Calcium Latest Ref Range: 8.5 - 10.1 MG/DL 8.9   GFR est non-AA Latest Ref Range: >60 ml/min/1.73m2 6 (L)   GFR est AA Latest Ref Range: >60 ml/min/1.73m2 7 (L)   Bilirubin, total Latest Ref Range: 0.2 - 1.0 MG/DL 0.7   Protein, total Latest Ref Range: 6.4 - 8.2 g/dL 7.3   Albumin Latest Ref Range: 3.4 - 5.0 g/dL 3.4   Globulin Latest Ref Range: 2.0 - 4.0 g/dL 3.9   A-G Ratio Latest Ref Range: 0.8 - 1.7   0.9   ALT Latest Ref Range: 13 - 56 U/L 15   AST Latest Ref Range: 15 - 37 U/L 26   Alk. phosphatase Latest Ref Range: 45 - 117 U/L 141 (H)   CK Latest Ref Range: 26 - 192 U/L 56   CK-MB Index Latest Ref Range: 0.0 - 4.0 % 2.1   CK - MB Latest Ref Range: <3.6 ng/ml 1.2   Troponin-I, Qt.  Latest Ref Range: 0.0 - 0.045 NG/ML 0.02   Crossmatch Expiration Unknown 05/17/2017   TYPE & SCREEN Unknown Rpt   ASPIRIN TEST Unknown Rpt   PLATELET FUNCTION, VERIFY NOW P2Y12 Unknown Rpt                                                                                                                                 DIET:  [x] Renal    [] Other     [] NPO     []  Diabetic      PRIMARY NURSE REPORT: First initial/Last name/Title      Pre Dialysis: Shiv Márquez RN    Time: 2235      EDUCATION:    [x] Patient [] Other         Knowledge Basis: []None [x]Minimal [] Substantial   Barriers to learning  []N/A   [] Access Care     [] S&S of infection     [] Fluid Management     []K+     []Procedural    []Albumin     [] Medications     [] Tx Options     [] Transplant     [] Diet     [] Other   Teaching Tools:  [x] Explain  [] Demo  [] Handouts [] Video  Patient response:   [x] Verbalized understanding  [] Teach back  [] Return demonstration [x] Requires follow up   Inappropriate due to            [address] Before each treatment:     Machine Number:                   [facility]                                  [] Unit Machine #  with centralized RO                                  [] Portable Machine #1/RO serial # [id number]                                  [] Portable Machine #2/RO serial # [id number]                                  [] Portable Machine #3/RO serial # [id number]                                                                                                       [address] [facility]                                  [x] Portable Machine #11/RO serial # [id number]                                   [] Portable Machine #12/RO serial # [id number]                                  [] Portable Machine #13/RO serial #  [id number]      Alarm Test:  Pass time 9940         Other:         [x] RO/Machine Log Complete      Temp 37.0            [x]Extracorporeal Circuit Tested for integrity   Dialysate: pH 7.0  Conductivity: Meter 14.0     HD Machine  14.1                TCD:13.9  Dialyzer Lot # [id number] 03/08/19 Blood Tubing Lot # [id number] 09/30/21          Saline Lot #  [id number] 01/01/19     CHLORINE TESTING-Before each treatment and every 4 hours    Total Chlorine:  [x] less than 0.1 ppm  Time: 2100 4 Hr/2nd Check Time:0000   (if greater than 0.1 ppm from Primary then every 30 minutes from Secondary)     TREATMENT INITIATION  with Dialysis Precautions:   [x] All Connections Secured                 [x] Saline Line Double Clamped   [x] Venous Parameters Set                  [x] Arterial Parameters Set    [x] Prime Given  250 ml               [x]Air Foam Detector Engaged      Treatment Initiation Note:   1966- Pt greeted in dialysis with Primary RN, Pt denies pain or discomfort at this time, vital signs stable, AVG in RUE accessed without complications, dialysis started   2300- Pt Continues dialysis  2330- Pt has no changes at this time, dialysis continues   0000- Pt continues dialysis   0030- No changes noted at this time, pt resting with eyes closed   0100- B/P 196/84  0130- Pt deneis pain or discomfort at this time      Medication Dose Volume Route Initials Dialyzer Cleared: [] Good [x] Fair  [] Poor    Blood processed: 57 L  UF Removed  2000 mL    Post Wt:   kg  POst BP: 130/42     Pulse: 84    Respirations: 18 Temperature: 97.4        NaCl 0.9%    2        250 mL Prime, 250 mL Rinse       IV      DEVYN     Post Tx Vascular Access: AVF/AVG: Bleeding stopped Art 5 min. Carlton. 5  Min                                   Catheter: Locking solution: Heparin 1:1000 Art. 5 mL  Carlton.  5mL                                  Post Assessment:                                    Skin:  [x] Warm  [x] Dry [] Diaphoretic    [] Flushed  [] Pale [] Cyanotic   [facility] Signatures Title Initials  Time Lungs: [x] Clear    [] Course  [] Crackles  [] Wheezing [] Diminished   Juanis Waldrop RN  [facility]   Cardiac: [] Regular   [] Irregular   [] Monitor  [x] N/A  Rhythm:           Edema:  [x] None    [] General     [] Facial   [] Pedal    [] UE    [] LE       Pain: [x]0  []1  []2   []3  []4   []5   []6   []7   []8   []9   []10         Post Treatment Note: 5828- Pt completed dialysis treatment removed 1.5 Liters uf without complications, vital signs stable, pt returned to room via floor staff     POST TREATMENT PRIMARY NURSE HANDOFF REPORT:     First initial/Last name/Title         Post Dialysis: Iris Lujan RN Time: 0203     Abbreviations: AVG-arterial venous graft, AVF-arterial venous fistula, IJ-Internal Jugular, Subcl-Subclavian, Fem-Femoral, Tx-treatment, AP/HR-apical heart rate, DFR-dialysate flow rate, BFR-blood flow rate, AP-arterial pressure, -venous pressure, UF-ultrafiltrate, TMP-transmembrane pressure, Carlton-Venous, Art-Arterial, RO-Reverse Osmosis

## 2017-05-16 NOTE — PROGRESS NOTES
Renal Progress Note  Follow up of ESRD     Assessment/Plan:  1. ESRD. Stable volume status, electrolytes. Continue dialysis mwf. 2. HTN. Restart zestril, avoid too tight bp control given acute cva.   3. Anemia of ESRD. H/H is above goal. No need for frank at this time. 4. Secondary hyperparathyroidism of ESRD. Continue phos binder. 5. New cva. Neurology on the case. Improving clinically. Subjective:  Patient complaints off: Feels better. A, Ox3, appropriate. Tolerated dialysis well last night. No SOB, chest pain, nausea or vomiting.        Patient Active Problem List   Diagnosis Code    Diabetes ([facility] E11.9    HTN (hypertension) I10    Glaucoma H40.9    Kidney failure N19    S/P breast biopsy, left Z98.890    Fibrosis, breast N60.39    ESRD on dialysis ([facility] N18.6, Z99.2    Chest pain R07.9    Abnormal EKG R94.31    Encephalopathy G93.40       Current Facility-Administered Medications   Medication Dose Route Frequency Provider Last Rate Last Dose    ondansetron (ZOFRAN) injection 1 mg  1 mg IntraVENous Q6H PRN China Putnam MD        aspirin (ASPIRIN) tablet 325 mg  325 mg Oral DAILY Francisco Chin MD   325 mg at 05/16/17 0934    atorvastatin (LIPITOR) tablet 80 mg  80 mg Oral QHS Francisco Chin MD   80 mg at 05/15/17 2145    acetaminophen (TYLENOL) tablet 650 mg  650 mg Oral Q4H PRN China Putnam MD        acetaminophen (TYLENOL) suppository 650 mg  650 mg Rectal Q4H PRN China Putnam MD        senna-docusate (PERICOLACE) 8.6-50 mg per tablet 2 Tab  2 Tab Oral QHS Francisco Chin MD   2 Tab at 05/15/17 2145    pantoprazole (PROTONIX) 40 mg in sodium chloride 0.9 % 10 mL injection  40 mg IntraVENous Q12H China Pelaez MD   40 mg at 05/16/17 0935    albuterol (PROVENTIL VENTOLIN) nebulizer solution 2.5 mg  2.5 mg Nebulization Q4H PRN China Putnam MD        folic acid (FOLVITE) 1 mg, thiamine (B-1) 100 mg in 0.9% sodium chloride 50 mL ivpb   IntraVENous DAILY Ifmayonyichukwu Yary Camargo MD        brinzolamide (AZOPT) 1 % ophthalmic suspension 1 Drop  1 Drop Both Eyes BID Favio Hooks MD   1 Drop at 05/16/17 0938    brimonidine (ALPHAGAN) 0.2 % ophthalmic solution 1 Drop  1 Drop Both Eyes BID Favio Hooks MD   1 Drop at 05/16/17 0938    B complex-vitaminC-folic acid (NEPHROCAP) cap  1 Cap Oral DAILY Favio Hooks MD   1 Cap at 05/16/17 0900    sevelamer carbonate (RENVELA) tab 800 mg  800 mg Oral TID Favio Hooks MD   800 mg at 05/16/17 0934    0.9% sodium chloride infusion  50 mL/hr IntraVENous DIALYSIS PRN Alaine Duverney, MD   Stopped at 05/15/17 1158    albumin human 25% (BUMINATE) solution 12.5 g  12.5 g IntraVENous DIALYSIS PRN Alaine Duverney, MD        timolol (TIMOPTIC) 0.5 % ophthalmic solution 1 Drop  1 Drop Both Eyes BID Anel Ugalde MD   1 Drop at 05/16/17 2535    enoxaparin (LOVENOX) injection 30 mg  30 mg SubCUTAneous Q24H Rubina Joy NP   30 mg at 05/16/17 3772    gabapentin (NEURONTIN) capsule 400 mg  400 mg Oral DAILY Jose BURCH MD   400 mg at 05/16/17 0935             Objective:  Vitals:    05/16/17 0155 05/16/17 0327 05/16/17 0534 05/16/17 1020   BP: 130/42 152/67 154/68 151/68   Pulse: 84 73 81 78   Resp: 18 20 18 18   Temp: 97.4 °F (36.3 °C) 98 °F (36.7 °C) 98.5 °F (36.9 °C) 98.6 °F (37 °C)   TempSrc: Oral      SpO2:  96% 96% 100%   Weight:   60 kg (132 lb 3.2 oz)      .   Intake/Output Summary (Last 24 hours) at 05/16/17 1029  Last data filed at 05/16/17 0534   Gross per 24 hour   Intake           750.83 ml   Output             1925 ml   Net         -1174.17 ml       Admission weight:Weight: 64.9 kg (143 lb) (05/14/17 2221)    Last Weight Metrics:  Weight Loss Metrics 5/16/2017 4/25/2017 1/14/2016 10/31/2012 9/15/2010   Today's Wt 132 lb 3.2 oz 145 lb 136 lb 1.6 oz 151 lb 179 lb   BMI 20.71 kg/m2 22.05 kg/m2 20.7 kg/m2 22.96 kg/m2 27.22 kg/m2            Physical Exam:     General: No acute distress. Neck: no jvd. LUNGS: CTA b.   CVS EXM: S1, S2, RRR. Abdomen: Soft, Non Tender, non distended. Lower Extremities: No Edema. Access: rt arm avg, dressings intact.        Labs:    CBC w/Diff Recent Labs      05/16/17   0510  05/14/17   2255   WBC  5.3  4.9   RBC  4.83  5.33*   HGB  12.8  14.5   HCT  39.4  44.0   PLT  120*  150   GRANS  62   --    LYMPH  21   --    EOS  5   --         Chemistry Recent Labs      05/16/17   0510  05/14/17   2255   GLU  56*  126*   NA  136  136   K  4.1  5.4   CL  99*  101   CO2  29  25   BUN  14  27*   CREA  4.29*  6.62*   CA  8.2*  8.9   AGAP  8  10   BUCR  3*  4*   AP  114  141*   TP  5.9*  7.3   ALB  2.8*  3.4   GLOB  3.1  3.9   AGRAT  0.9  0.9          No results found for: IRON, FE, TIBC, IBCT, PSAT, FERR Lab Results   Component Value Date/Time    Calcium 8.2 05/16/2017 05:10 AM          Fiona Traore M.D  [facility]  Office [phone]  Pager [phone]

## 2017-05-16 NOTE — PROGRESS NOTES
Problem: Dysphagia (Adult)  Goal: *Acute Goals and Plan of Care (Insert Text)  Recommendations:  Diet: mech-soft/thin  Meds: per pt preference  Aspiration Precautions  Oral Care TID    Goals: Patient will:  1. Tolerate PO trials with 0 s/s overt distress in 4/5 trials-- met   2. Utilize compensatory swallow strategies/maneuvers (decrease bite/sip, size/rate, alt. liq/sol) with min cues in 4/5 trials-- met   Outcome: Resolved/Met Date Met:  05/16/17  SPEECH LANGUAGE PATHOLOGY DYSPHAGIA TREATMENT & DISCHARGE     Patient: Roxana Clark (30 y.o. female)  Date: 5/16/2017  Diagnosis: Encephalopathy <principal problem not specified>       Precautions: swallowing, Fall      ASSESSMENT:  Pt presents with functional oropharyngeal swallow skills with regular solids/s thin liquids. Pt demo appropriate bite size, mildly prolonged mastication, functional given increased time, timely swallow response with fair laryngeal elevation. No overt s/sx aspiration. Progression toward goals:  [X]         Improving appropriately - goals met/approximated  [ ]         Not making progress/Not appropriate - will d/c POC       PLAN:  Recommendations and Planned Interventions:  Maximum therapeutic gains met; safest, least restrictive diet achieved. D/C ST intervention at this time. Discharge Recommendations:  None       SUBJECTIVE:   Patient stated I've been eating those\" (ector alatorre)      OBJECTIVE:   Cognitive and Communication Status:  Neurologic State: Alert, Appropriate for age  Orientation Level: Oriented X4  Cognition: Follows commands, Appropriate for age attention/concentration  Perception: Appears intact  Perseveration: No perseveration noted  Safety/Judgement: Awareness of environment  Dysphagia Treatment:  Oral Assessment:  Oral Assessment  Labial: No impairment  Dentition: Intact  Oral Hygiene: wfl  Lingual: No impairment  Velum: No impairment  Mandible: No impairment  P.O.  Trials:              Patient Position: 90* in bed              Vocal quality prior to P.O.: No impairment              Consistency Presented: Solid, Thin liquid              How Presented: Self-fed/presented, Straw                             Bolus Acceptance: No impairment              Bolus Formation/Control: No impairment              Type of Impairment: Delayed, Mastication              Propulsion: No impairment              Oral Residue: None              Initiation of Swallow: No impairment              Laryngeal Elevation: Functional              Aspiration Signs/Symptoms: None              Pharyngeal Phase Characteristics: Double swallow              Effective Modifications: Small sips and bites              Cues for Modifications: Minimal                                Oral Phase Severity: No impairment              Pharyngeal Phase Severity : No impairment                    PAIN:  Start of Tx: 0  End of Tx: 0      After treatment:   [ ]              Patient left in no apparent distress sitting up in chair  [X]              Patient left in no apparent distress in bed  [X]              Call bell left within reach  [X]              Nursing notified  [ ]              Caregiver present  [ ]              Bed alarm activated         COMMUNICATION/EDUCATION:   [X]        Aspiration precautions; swallow safety; compensatory techniques  [ ]        Patient unable to participate in education; education ongoing with staff  [ ]         Posted safety precautions in patient's room.   [ ]         Oral-motor/laryngeal strengthening exercises     Laly Cm MS, CCC/SLP  Time Calculation: 10 mins

## 2017-05-16 NOTE — PROGRESS NOTES
Progress Note      Patient: Yahir Andino               Sex: female          DOA: 5/14/2017       YOB: 1945      Age:  74 y.o.        LOS:  LOS: 1 day               Subjective:   Pt is s/p cva . Her speech is imptroved . She is having difficulty in walking as per pt the patient seems to be swallowing well the patient suggest the pt needs to go to a rehab in sn[place]       Objective:      Visit Vitals    /70    Pulse 81    Temp 98.6 °F (37 °C)    Resp 20    Wt 60 kg (132 lb 3.2 oz)    SpO2 100%    BMI 20.71 kg/m2       Physical Exam:  Pt is awake and is responsive . Heart reg rate and rhythm   Lungs fair breath sounds heard   Abdomen soft and nontender   Neuro speech is improved . has difficulty in walking        Lab/Data Reviewed:  CMP:   Lab Results   Component Value Date/Time     05/16/2017 05:10 AM    K 4.1 05/16/2017 05:10 AM    CL 99 (L) 05/16/2017 05:10 AM    CO2 29 05/16/2017 05:10 AM    AGAP 8 05/16/2017 05:10 AM    GLU 56 (L) 05/16/2017 05:10 AM    BUN 14 05/16/2017 05:10 AM    CREA 4.29 (H) 05/16/2017 05:10 AM    GFRAA 12 (L) 05/16/2017 05:10 AM    GFRNA 10 (L) 05/16/2017 05:10 AM    CA 8.2 (L) 05/16/2017 05:10 AM    ALB 2.8 (L) 05/16/2017 05:10 AM    TP 5.9 (L) 05/16/2017 05:10 AM    GLOB 3.1 05/16/2017 05:10 AM    AGRAT 0.9 05/16/2017 05:10 AM    SGOT 19 05/16/2017 05:10 AM    ALT 12 (L) 05/16/2017 05:10 AM     CBC:   Lab Results   Component Value Date/Time    WBC 5.3 05/16/2017 05:10 AM    HGB 12.8 05/16/2017 05:10 AM    HCT 39.4 05/16/2017 05:10 AM     (L) 05/16/2017 05:10 AM           Assessment/Plan     Active Problems:    Encephalopathy (5/15/2017). Pt is s/p left hemispheric cva with initial difficulties with speech . An mra shows no evidence of stenosis of   Intracranial vessels . she has 60 % stenosis of the left  ICA  As seen in the neck mri  esrd     Plan:pt will continue [facility] rehab in a snf

## 2017-05-16 NOTE — ROUTINE PROCESS
Assumed care of patient - sleeping, easily arousable. Alert and oriented  No complaints of pain.     9777 - patient assisted to side of bed for breakfast - AM meds given    1000 - NIH stroke scale remains unchanged    1100 - family at bedside - sitting up on side of bed    1230 - sitting up on side of bed for lunch    1400 - stroke education provided - patient states she had a CVA in the past and feels as though she is close to her baseline -    1700 - evening meds and eye drops given

## 2017-05-16 NOTE — PROGRESS NOTES
1927  Received bedside verbal shift report. Pt sitting up in bed family at bedside. piv # 20 to left arm intact. nsr on telemetry box # 62. Call bell within reach side rails up x 2, bed low and locked. No complaints offered, pt instructed to call for assistance. 2200  NIH performed. Pt with increased changes to aphasia, page out to hospitalist.     Call back from hospitalist, made aware of decline of mental status      2238  Tammy from icu at bedside. 18  Pt off unit via bed with nurse to ct. 2340  Back to unit from ct    0100  Call from radiology with ct wet read. No changes from previous ct scan. 9520  Bath given at this time. New bed pad linen and gown placed. Pt resting comfortably. 0440  Pt sitting up at bedside. 0745 Bedside and Verbal shift change report given to nsr geraldo ruiz rn (oncoming nurse) by juwan zheng rn  (offgoing nurse).  Report included the following information SBAR, Kardex, Intake/Output, MAR, Recent Results and Med Rec Status

## 2017-05-16 NOTE — MANAGEMENT PLAN
Discharge [facility] has accepted pt once 3 Overnights met and medically stable      [facility]  RN BSN  Outcomes Manager  Pager # [phone]

## 2017-05-16 NOTE — PROGRESS NOTES
Problem: Self Care Deficits Care Plan (Adult)  Goal: *Acute Goals and Plan of Care (Insert Text)  Occupational Therapy Goals  Initiated 5/15/2017 within 7 day(s). 1. Patient will perform grooming tasks at EOB with supervision for balance. 2. Patient will perform upper body dressing with supervision and minimal verbal cues to maintain midline in sitting. 3. Patient will perform functional task in standing for 3 minutes with minimal assistance to increase activity tolerance for ADLs. 4. Patient will perform toilet transfers with moderate assistance . 5. Patient will perform all aspects of toileting with moderate assistance . 6. Patient will participate in upper extremity therapeutic exercise/activities with minimal assistance for 8 minutes to increase/maintain strength/AROM of BUEs for ADLs. 7. Patient will utilize energy conservation techniques during functional activities with minimal verbal cues. Outcome: Progressing Towards Goal  OCCUPATIONAL THERAPY TREATMENT     Patient: Tamra Kamara (3 y.o. female)  Date: 5/16/2017  Diagnosis: Encephalopathy <principal problem not specified>       Precautions: Fall  Chart, occupational therapy assessment, plan of care, and goals were reviewed. ASSESSMENT:  Pt seated EOB w/posterior lean supported by pillows upon entry. Pt is pleasantly confused, agreeable to therapy. Pt moves BUE against gravity and requires assist to achieve full ROM. Pt requires hand over hand assist to initiate simple ADL grooming task w/hand hygiene and increase time for task completion. Pt legs appear contracted and pt requires Mod Assist w/bed mobility lifting BLEs into supine. BLE flexed at the knees in supine and require assist to achieve extension. EDUCATION Pt educated on importance of UE TherEx and encouraged to perform throughout the day.   Progression toward goals:  [ ]          Improving appropriately and progressing toward goals  [X]          Improving slowly and progressing toward goals  [ ]          Not making progress toward goals and plan of care will be adjusted       PLAN:  Patient continues to benefit from skilled intervention to address the above impairments. Continue treatment per established plan of care. Discharge Recommendations:  Nasim Luz  Further Equipment Recommendations for Discharge:   TBD by SNF staff       SUBJECTIVE:   Patient stated I can't do anything right.       OBJECTIVE DATA SUMMARY:         Cognitive/Behavioral Status:  Neurologic State: Alert, Confused  Orientation Level: Oriented to person  Cognition: Decreased attention/concentration, Follows commands  Safety/Judgement: Awareness of environment  Functional Mobility and Transfers for ADLs:              Bed Mobility:  Rolling: Minimum assistance  Sit to Supine: Moderate assistance (requires assist w/BLEs)  Scooting: Moderate assistance  Balance:  Sitting: Impaired  Sitting - Static: Fair (occasional)  Sitting - Dynamic: Fair (occasional)  ADL Intervention:  Grooming  Washing Hands: Stand-by assistance (hand over hand assist to initiate)     Cognitive Retraining  Safety/Judgement: Awareness of environment     Therapeutic Exercises: (seated EOB)  AAROM BUE shoulder flexion/extension  BUE scapular ab/adduction, elevation/depression     Pain:  Pre Treatment:0  Post Treatment:0  Pain Scale 1: Numeric (0 - 10)  Pain Intensity 1: 0     Activity Tolerance:    Fair     Please refer to the flowsheet for vital signs taken during this treatment.   After treatment:   [ ]  Patient left in no apparent distress sitting up in chair  [X]  Patient left in no apparent distress in bed in chair position  [X]  Call bell left within reach  [ ]  Nursing notified  [ ]  Caregiver present  [ ]  Bed alarm activated     RE [place]  Time Calculation: 25 mins

## 2017-05-16 NOTE — ROUTINE PROCESS
Orders received and chart reviewed. Evaluation completed formal assessment to follow. patient to benefit from rehab at snf to work on transfers   Bed to wheelchair and bathroom .

## 2017-05-17 ENCOUNTER — HOSPITAL ENCOUNTER (OUTPATIENT)
Dept: CT IMAGING | Age: 72
Discharge: HOME | End: 2017-05-17
Attending: HOSPITALIST

## 2017-05-17 LAB
GLUCOSE BLD STRIP.AUTO-MCNC: 137 MG/DL (ref 70–110)
GLUCOSE BLD STRIP.AUTO-MCNC: 70 MG/DL (ref 70–110)

## 2017-05-17 PROCEDURE — 74011250637 HC RX REV CODE- 250/637: Performed by: FAMILY MEDICINE

## 2017-05-17 PROCEDURE — 74011250637 HC RX REV CODE- 250/637: Performed by: INTERNAL MEDICINE

## 2017-05-17 PROCEDURE — 65660000000 HC RM CCU STEPDOWN

## 2017-05-17 PROCEDURE — 90935 HEMODIALYSIS ONE EVALUATION: CPT

## 2017-05-17 PROCEDURE — 77030011256 HC DRSG MEPILEX <16IN NO BORD MOLN -A

## 2017-05-17 PROCEDURE — C9113 INJ PANTOPRAZOLE SODIUM, VIA: HCPCS | Performed by: FAMILY MEDICINE

## 2017-05-17 PROCEDURE — 74011000250 HC RX REV CODE- 250: Performed by: FAMILY MEDICINE

## 2017-05-17 PROCEDURE — 74011250636 HC RX REV CODE- 250/636: Performed by: NURSE PRACTITIONER

## 2017-05-17 PROCEDURE — 74011250636 HC RX REV CODE- 250/636: Performed by: FAMILY MEDICINE

## 2017-05-17 PROCEDURE — 82962 GLUCOSE BLOOD TEST: CPT

## 2017-05-17 RX ADMIN — SEVELAMER CARBONATE 800 MG: 800 TABLET, FILM COATED ORAL at 22:02

## 2017-05-17 RX ADMIN — BRIMONIDINE TARTRATE 1 DROP: 2 SOLUTION/ DROPS OPHTHALMIC at 09:16

## 2017-05-17 RX ADMIN — SODIUM CHLORIDE 40 MG: 9 INJECTION INTRAMUSCULAR; INTRAVENOUS; SUBCUTANEOUS at 22:02

## 2017-05-17 RX ADMIN — BRINZOLAMIDE 1 DROP: 10 SUSPENSION/ DROPS OPHTHALMIC at 09:16

## 2017-05-17 RX ADMIN — GABAPENTIN 400 MG: 400 CAPSULE ORAL at 09:11

## 2017-05-17 RX ADMIN — SEVELAMER CARBONATE 800 MG: 800 TABLET, FILM COATED ORAL at 09:11

## 2017-05-17 RX ADMIN — LISINOPRIL 20 MG: 20 TABLET ORAL at 09:11

## 2017-05-17 RX ADMIN — TIMOLOL MALEATE 1 DROP: 5 SOLUTION OPHTHALMIC at 09:16

## 2017-05-17 RX ADMIN — ENOXAPARIN SODIUM 30 MG: 40 INJECTION SUBCUTANEOUS at 05:15

## 2017-05-17 RX ADMIN — ASPIRIN 325 MG ORAL TABLET 325 MG: 325 PILL ORAL at 09:11

## 2017-05-17 RX ADMIN — SODIUM CHLORIDE 40 MG: 9 INJECTION INTRAMUSCULAR; INTRAVENOUS; SUBCUTANEOUS at 01:04

## 2017-05-17 RX ADMIN — PRAVASTATIN SODIUM 2 TABLET: 20 TABLET ORAL at 22:02

## 2017-05-17 RX ADMIN — ATORVASTATIN CALCIUM 80 MG: 40 TABLET, FILM COATED ORAL at 22:02

## 2017-05-17 RX ADMIN — ASCORBIC ACID, FOLIC ACID, NIACIN, THIAMINE, RIBOFLAVIN, PYRIDOXINE, CYANOCOBALAMIN, PANTOTHENIC ACID, BIOTIN 1 CAPSULE: 100; 150; 6; 1; 20; 5; 10; 1.7; 1.5 CAPSULE, LIQUID FILLED ORAL at 09:00

## 2017-05-17 NOTE — PROGRESS NOTES
Received patient from [address] [place] [address] via her bed accompanied by 2 RN's. Patient is alert, oriented X2. Dual NIH scale done with Enrique Sutherland RN. Patient denies any pain/ discomfort at this time. Bed is locked and in lowest position and call bell is within reach. Not in acute distress. 1600  - Patient is transported to Dialysis unit per stretcher accompanied by Mya Moreland.

## 2017-05-17 NOTE — PROGRESS NOTES
OT attempted multiple times, pt eating lunch at 1p, transfer to 2117 and now off the unit for dialysis. Will f/u 5/18/17.     Ector Chain, GRIMALDO/L

## 2017-05-17 NOTE — MANAGEMENT PLAN
Discharge Plan    Patient discussed on rounds. Acute mental status changes. To be evaluated for new or continued stroke. [facility] pt when medically ready to discharge.            Mili Temple RN BSN  Outcomes Manager  Pager # [phone]

## 2017-05-17 NOTE — PROGRESS NOTES
Asked to see patient for change in NIH. Pt. Alert, follows commands, difficulty with verbalization. States \"I cant find my words\". Needs frequent breaks during verbal assessments of NIH to refocus. Gets stuck on same syllable words, repeats same syllable words several times. Able to refocus after brief rest period. MAEW, bilateral lower extremities extremely weak. No notable changes from Neurologist assessment. Will continue to monitor. Dr. Nilam Cardona aware and ordered repeat dry CT of head. Will follow up on patient. 0320-Called for update from bedside RN, Noelle Garrison. No changes in NIH and no changes in CT of head per Radiology. Will continue to monitor.

## 2017-05-17 NOTE — PROGRESS NOTES
Renal Progress Note  Follow up of ESRD     Assessment/Plan:  1. ESRD. Stable volume status, electrolytes. Dialysis later today and continue mwf. 2. HTN. Hold zestril, avoid too tight bp control given acute cva.   3. Anemia of ESRD. H/H is above goal. No need for frank at this time. 4. Secondary hyperparathyroidism of ESRD. Continue phos binder. 5. New cva. Neurology on the case. Improving clinically. Subjective:  Patient complaints off: Feels better. A, Ox3, appropriate. No SOB, chest pain, nausea or vomiting.        Patient Active Problem List   Diagnosis Code    Diabetes ([facility] E11.9    HTN (hypertension) I10    Glaucoma H40.9    Kidney failure N19    S/P breast biopsy, left Z98.890    Fibrosis, breast N60.39    [facility] ([facility] N18.6, Z99.2    Chest pain R07.9    Abnormal EKG R94.31    Encephalopathy G93.40    Acute ischemic stroke ([facility] I63.9       Current Facility-Administered Medications   Medication Dose Route Frequency Provider Last Rate Last Dose    lisinopril (PRINIVIL, ZESTRIL) tablet 20 mg  20 mg Oral DAILY Maxim Leia BURCH MD   20 mg at 05/17/17 0911    ondansetron (ZOFRAN) injection 1 mg  1 mg IntraVENous Q6H PRN Rainer Roe MD        aspirin (ASPIRIN) tablet 325 mg  325 mg Oral DAILY Rainer Roe MD   325 mg at 05/17/17 0911    atorvastatin (LIPITOR) tablet 80 mg  80 mg Oral QHS Rainer Roe MD   80 mg at 05/16/17 2338    acetaminophen (TYLENOL) tablet 650 mg  650 mg Oral Q4H PRN China Chakraborty MD        acetaminophen (TYLENOL) suppository 650 mg  650 mg Rectal Q4H PRN China Chakraborty MD        senna-docusate (PERICOLACE) 8.6-50 mg per tablet 2 Tab  2 Tab Oral QHS Rainer Roe MD   2 Tab at 05/16/17 2338    pantoprazole (PROTONIX) 40 mg in sodium chloride 0.9 % 10 mL injection  40 mg IntraVENous Q12H China Pelaez, MD   40 mg at 05/17/17 0104    albuterol (PROVENTIL VENTOLIN) nebulizer solution 2.5 mg  2.5 mg Nebulization Q4H PRN Britta [place] MD        folic acid (FOLVITE) 1 mg, thiamine (B-1) 100 mg in 0.9% sodium chloride 50 mL ivpb   IntraVENous DAILY Ifeadelaney Caban MD        brinzolamide (AZOPT) 1 % ophthalmic suspension 1 Drop  1 Drop Both Eyes BID Britta Florence MD   1 Drop at 05/17/17 0916    brimonidine (ALPHAGAN) 0.2 % ophthalmic solution 1 Drop  1 Drop Both Eyes BID Britta [place] MD   1 Drop at 05/17/17 0916    B complex-vitaminC-folic acid (NEPHROCAP) cap  1 Cap Oral DAILY Britta Florence MD   1 Cap at 05/17/17 0900    sevelamer carbonate (RENVELA) tab 800 mg  800 mg Oral TID Britta [place] MD   800 mg at 05/17/17 0911    0.9% sodium chloride infusion  50 mL/hr IntraVENous DIALYSIS PRN Edgar Agustin MD   Stopped at 05/15/17 1158    albumin human 25% (BUMINATE) solution 12.5 g  12.5 g IntraVENous DIALYSIS PRN Edgar Agustin MD        timolol (TIMOPTIC) 0.5 % ophthalmic solution 1 Drop  1 Drop Both Eyes BID Colten Calixto MD   1 Drop at 05/17/17 0916    enoxaparin (LOVENOX) injection 30 mg  30 mg SubCUTAneous Q24H Rubina Joy NP   30 mg at 05/17/17 0515    gabapentin (NEURONTIN) capsule 400 mg  400 mg Oral DAILY Luz Elena BURCH MD   400 mg at 05/17/17 0911             Objective:  Vitals:    05/17/17 0327 05/17/17 0658 05/17/17 0917 05/17/17 1424   BP: 151/77 102/61 104/66 124/79   Pulse: 87 84 88 73   Resp: 18 18 20 18   Temp: 98.8 °F (37.1 °C) 99 °F (37.2 °C) 98.8 °F (37.1 °C) 98 °F (36.7 °C)   TempSrc:       SpO2: 100% 96% 100% 97%   Weight:         . No intake or output data in the 24 hours ending 05/17/17 1506    Admission weight:Weight: 64.9 kg (143 lb) (05/14/17 2221)    Last Weight Metrics:  Weight Loss Metrics 5/16/2017 4/25/2017 1/14/2016 10/31/2012 9/15/2010   Today's Wt 132 lb 3.2 oz 145 lb 136 lb 1.6 oz 151 lb 179 lb   BMI 20.71 kg/m2 22.05 kg/m2 20.7 kg/m2 22.96 kg/m2 27.22 kg/m2            Physical Exam:     General: No acute distress. Neck: no jvd. LUNGS: CTA b.   CVS EXM: S1, S2, RRR. Abdomen: Soft, Non Tender, non distended. Lower Extremities: No Edema. Access: rt arm avg, dressings intact.        Labs:    CBC w/Diff Recent Labs      05/16/17   0510  05/14/17   2255   WBC  5.3  4.9   RBC  4.83  5.33*   HGB  12.8  14.5   HCT  39.4  44.0   PLT  120*  150   GRANS  62   --    LYMPH  21   --    EOS  5   --         Chemistry Recent Labs      05/16/17   0510  05/14/17   2255   GLU  56*  126*   NA  136  136   K  4.1  5.4   CL  99*  101   CO2  29  25   BUN  14  27*   CREA  4.29*  6.62*   CA  8.2*  8.9   AGAP  8  10   BUCR  3*  4*   AP  114  141*   TP  5.9*  7.3   ALB  2.8*  3.4   GLOB  3.1  3.9   AGRAT  0.9  0.9          No results found for: IRON, FE, TIBC, IBCT, PSAT, FERR   Lab Results   Component Value Date/Time    Calcium 8.2 05/16/2017 05:10 AM          Sheila Ledbetter M.D  [facility]  Office [phone]  Pager [phone]

## 2017-05-17 NOTE — DIALYSIS
ACUTE HEMODIALYSIS FLOW SHEET    HEMODIALYSIS ORDERS: Physician:Zaira     Dialyzer:  revaclear      Duration: 3 hr  BFR:350    DFR: 800   Dialysate:  Temp36  K+  2     Ca+ 2.5  Na138  Bicarb 35   Weight:   kg    Bed Scale []     Unable to Obtain [x]      Dry weight/UF Goal: 1500 Access AVGNeedle Gauge     Heparin []  Bolus      Units    [] Hourly       Units    [x]None     Catheter locking solution    Pre BP: 120/45      Pulse:   77   Temperature:  98.1   Respirations: 16  Tx: NS       ml/Bolus  Other        [] N/A/   Labs: Pre        Post:        [x] N/A   Additional Orders(medications, blood products, hypotension management):       [x] N/A     [x] Time Out/Safety Check  [x] [facility] Consent Verified     CATHETER ACCESS: [x]N/A   []Right   []Left   []IJ     []Fem   [] First use X-ray verified     []Tunnel                [] Non Tunneled   []No S/S infection  []Redness  []Drainage []Cultured []Swelling []Pain   []Medical Aseptic Prep Utilized   []Dressing Changed  [] Biopatch  Date:       []Clotted   []Patent   Flows: []Good  []Poor  []Reversed   If access problem,  notified: []Yes    [x]N/A  Date:           GRAFT/FISTULA ACCESS:  []N/A     [x]Right     []Left     [x]UE     []LE   [x]AVG   []AVF        []Buttonhole    [x]Medical Aseptic Prep Utilized   [x]No S/S infection  []Redness  []Drainage []Cultured []Swelling []Pain    Bruit:   [x] Strong    [] Weak       Thrill :   [x] Strong    [] Weak       Needle Gauge: 15   Length: 1inch   If access problem,  notified: []Yes     [x]N/A  Date:        Please describe access if present and not used:       GENERAL ASSESSMENT:    LUNGS:  Rate  SaO2%        [] N/A    [x] Clear  [] Coarse  [] Crackles  [] Wheezing        [] Diminished     Location : []RLL   []LLL    []RUL  []MAGDIEL   Cough: []Productive  []Dry  [x]N/A   Respirations:  [x]Easy  []Labored   Therapy:  [x]RA  []NC  l/min    Mask: []NRB []Venti       O2%                  []Ventilator  []Intubated  [] Trach  [] BiPaP   CARDIAC: [x]Regular      [] Irregular   [] Pericardial Rub  [] JVD        []  Monitored  [] Bedside  [] Remotely monitored [] N/A  Rhythm:    EDEMA: [x] None  []Generalized  [] Pitting [] 1    [] 2    [] 3    [] 4                 [] Facial  [] Pedal  []  UE  [] LE   SKIN:   [x] Warm  [] Hot     [] Cold   [x] Dry     [] Pale   [] Diaphoretic                  [] Flushed  [] Jaundiced  [] Cyanotic  [] Rash  [] Weeping   LOC:    [x] Alert      [x]Oriented:    [x] Person     [x] Place  [x]Time               [] Confused  [] Lethargic  [] Medicated  [] Non-responsive     GI / ABDOMEN:  [] Flat    [] Distended    [x] Soft    [] Firm   []  Obese                             [] Diarrhea  [x] Bowel Sounds  [] Nausea  [] Vomiting       / URINE ASSESSMENT:[] Voiding   [x] Oliguria  [] Anuria   []  Galindo     [] Incontinent    []  Incontinent Brief      []  Bathroom Privileges     PAIN: [x] 0 []1  []2   []3   []4   []5   []6   []7   []8   []9   []10            Scale 0-10  Action/Follow Up:    MOBILITY:  [] Amb    [] Amb/Assist    [x] Bed    [] Wheelchair  [] Stretcher      All Vitals and Treatment Details on Attached [address]:    [facility]       Room # 2117     [] 1st Time Acute  [] Stat  [x] Routine  [] Urgent     [x] Acute Room  []  Bedside  [] ICU/CCU  [] ER   Isolation Precautions:  [x] Dialysis   [] Airborne   [] Contact    [] Reverse   Special Considerations:         [] Blood Consent Verified [x]N/A     ALLERGIES:   [x] NKA          Code Status:  [x] Full Code  [] DNR  [] Other           HBsAg ONLY: Date Drawn 4/19/17         [x]Negative []Positive []Unknown   HBsAb: Date 1/18/17    [] Susceptible   [x] [id number] []Not Drawn  [] Drawn     Current Labs:    Date of Labs: Today [x]       Results for Sly Newton (MRN [id number]) as of 5/17/2017 16:56   Ref.  Range 5/16/2017 05:10   WBC Latest Ref Range: 4.6 - 13.2 K/uL 5.3   RBC Latest Ref Range: 4.20 - 5.30 M/uL 4.83   HGB Latest Ref Range: 12.0 - 16.0 g/dL 12.8   HCT Latest Ref Range: 35.0 - 45.0 % 39.4   MCV Latest Ref Range: 74.0 - 97.0 FL 81.6   MCH Latest Ref Range: 24.0 - 34.0 PG 26.5   MCHC Latest Ref Range: 31.0 - 37.0 g/dL 32.5   RDW Latest Ref Range: 11.6 - 14.5 % 17.6 (H)   PLATELET Latest Ref Range: 135 - 420 K/uL 120 (L)   MPV Latest Ref Range: 9.2 - 11.8 FL 10.7   NEUTROPHILS Latest Ref Range: 40 - 73 % 62   LYMPHOCYTES Latest Ref Range: 21 - 52 % 21   MONOCYTES Latest Ref Range: 3 - 10 % 12 (H)   EOSINOPHILS Latest Ref Range: 0 - 5 % 5   BASOPHILS Latest Ref Range: 0 - 2 % 0   DF Latest Units:   AUTOMATED   ABS. NEUTROPHILS Latest Ref Range: 1.8 - 8.0 K/UL 3.2   ABS. LYMPHOCYTES Latest Ref Range: 0.9 - 3.6 K/UL 1.1   ABS. MONOCYTES Latest Ref Range: 0.05 - 1.2 K/UL 0.6   ABS. EOSINOPHILS Latest Ref Range: 0.0 - 0.4 K/UL 0.3   ABS. BASOPHILS Latest Ref Range: 0.0 - 0.06 K/UL 0.0   Sodium Latest Ref Range: 136 - 145 mmol/L 136   Potassium Latest Ref Range: 3.5 - 5.5 mmol/L 4.1   Chloride Latest Ref Range: 100 - 108 mmol/L 99 (L)   CO2 Latest Ref Range: 21 - 32 mmol/L 29   Anion gap Latest Ref Range: 3.0 - 18 mmol/L 8   Glucose Latest Ref Range: 74 - 99 mg/dL 56 (L)   BUN Latest Ref Range: 7.0 - 18 MG/DL 14   Creatinine Latest Ref Range: 0.6 - 1.3 MG/DL 4.29 (H)   BUN/Creatinine ratio Latest Ref Range: 12 - 20   3 (L)   Calcium Latest Ref Range: 8.5 - 10.1 MG/DL 8.2 (L)   GFR est non-AA Latest Ref Range: >60 ml/min/1.73m2 10 (L)   GFR est AA Latest Ref Range: >60 ml/min/1.73m2 12 (L)   Bilirubin, total Latest Ref Range: 0.2 - 1.0 MG/DL 0.6   Protein, total Latest Ref Range: 6.4 - 8.2 g/dL 5.9 (L)   Albumin Latest Ref Range: 3.4 - 5.0 g/dL 2.8 (L)   Globulin Latest Ref Range: 2.0 - 4.0 g/dL 3.1   A-G Ratio Latest Ref Range: 0.8 - 1.7   0.9   ALT Latest Ref Range: 13 - 56 U/L 12 (L)   AST Latest Ref Range: 15 - 37 U/L 19   Alk. phosphatase Latest Ref Range: 45 - 117 U/L 114    Cut and paste current labs here. DIET:  [x] Renal    [] Other     [] NPO     [x]  Diabetic      PRIMARY NURSE REPORT: First initial/Last name/Title      Pre Dialysis:JOHANNA Neely RN    Time:  8188     EDUCATION:    [x] Patient [] Other         Knowledge Basis: []None [x]Minimal [] Substantial   Barriers to learning  []N/A   [x] Access Care     [] S&S of infection     [] Fluid Management     []K+     [x]Procedural    []Albumin     [] Medications     [] Tx Options     [] Transplant     [] Diet     [] Other   Teaching Tools:  [x] Explain  [] Demo  [] Handouts [] Video  Patient response:   [x] Verbalized understanding  [] Teach back  [] Return demonstration [] Requires follow up   Inappropriate due to            [address] Before each treatment:     Machine Number:                   [facility]                                  [] Unit Machine #  with centralized RO                                  [] Portable Machine #1/RO serial # [id number]                                  [] Portable Machine #2/RO serial # [id number]                                  [] Portable Machine #3/RO serial # [id number]                                                                                                       [address] [facility]                                  [] Portable Machine #11/RO serial # [id number]                                   [x] Portable Machine #12/RO serial # [id number]                                  [] Portable Machine #13/RO serial #  [id number]      Alarm Test:  Pass time  1600        Other:         [x] RO/Machine Log Complete      Temp 36     Extracorporeal Circuit Tested for integrity   Dialysate: pH7.2   Conductivity: Meter  14  HD Machine  14       TCD: 13.9  Dialyzer Lot #  [id number]      Blood Tubing Lot #  [id number]   Saline Lot #  [id number]     CHLORINE TESTING-Before each treatment and every 4 hours    Total Chlorine: [x] less than 0.1 ppm  Time:1600  4 Hr/2nd Check Time:    (if greater than 0.1 ppm from Primary then every 30 minutes from Secondary)     TREATMENT INITIATION  with Dialysis Precautions:   [x] All Connections Secured                 [x] Saline Line Double Clamped   [x] Venous Parameters Set                  [x] Arterial Parameters Set    [x] Prime Given   ml   250            [x]Air Foam Detector Engaged      Treatment Initiation Note:started treatment using her rt arm graft. Medication Dose Volume Route Initials Dialyzer Cleared: [x] Good [] Fair  [] Poor    Blood processed:   L  UF Removed 1000  Ml    Post Wt:     kg  POst BP:136/37          Pulse:    82   Respirations:   Temperature:                                    Post Tx Vascular Access: AVF/AVG: Bleeding stopped Art 6 min. Carlton. 5 Min   N/A                                   Catheter: Locking solution: Heparin 1:1000 Art. Carlton. N/A                                 Post Assessment:                                    Skin:  [x] Warm  [x] Dry [] Diaphoretic    [] Flushed  [] Pale [] Cyanotic   [facility] Signatures Title Initials  Time Lungs: [x] Clear    [] Course  [] Crackles  [] Wheezing [] Diminished       Cardiac: [x] Regular   [] Irregular   [] Monitor  [] N/A  Rhythm:           Edema:  [x] None    [] General     [] Facial   [] Pedal    [] UE    [] LE       Pain: [x]0  []1  []2   []3  []4   []5   []6   []7   []8   []9   []10     Pt tolerated treatment well. She had a large BM .      POST TREATMENT PRIMARY NURSE HANDOFF REPORT:     First initial/Last name/Title         Post Dialysis:  Time:  1930     Abbreviations: AVG-arterial venous graft, AVF-arterial venous fistula, IJ-Internal Jugular, Subcl-Subclavian, Fem-Femoral, Tx-treatment, AP/HR-apical heart rate, DFR-dialysate flow rate, BFR-blood flow rate, AP-arterial pressure, -venous pressure, UF-ultrafiltrate, TMP-transmembrane pressure, Carlton-Venous, Art-Arterial, RO-Reverse Osmosis

## 2017-05-17 NOTE — ROUTINE PROCESS
Pt care received. Pt A/O x 1-2. Pt resting in bed. Denies pain and appears negative for pain/distress. Pt stable. Call light within reach, bed in lowest position and locked.

## 2017-05-17 NOTE — ROUTINE PROCESS
TRANSFER - OUT REPORT:    Verbal report given to Mark Sheikh RN(name) on Tony De La Torre  being transferred to Asif Gleason RN  (unit) for routine progression of care       Report consisted of patients Situation, Background, Assessment and   Recommendations(SBAR). Information from the following report(s) SBAR, Kardex, Intake/Output and MAR was reviewed with the receiving nurse. Lines:   Peripheral IV 05/14/17 Left Forearm (Active)   Site Assessment Clean, dry, & intact 5/17/2017  9:17 AM   Phlebitis Assessment 0 5/17/2017  9:17 AM   Infiltration Assessment 0 5/17/2017  9:17 AM   Dressing Status Clean, dry, & intact 5/17/2017  9:17 AM   Dressing Type Tape;Transparent 5/17/2017  9:17 AM   Hub Color/Line Status Pink;Capped;Flushed 5/17/2017  9:17 AM   Action Taken Open ports on tubing capped 5/15/2017  8:05 PM   Alcohol Cap Used Yes 5/17/2017  9:17 AM        Opportunity for questions and clarification was provided.       Patient transported with:   Registered Nurse

## 2017-05-18 LAB
GLUCOSE BLD STRIP.AUTO-MCNC: 103 MG/DL (ref 70–110)
GLUCOSE BLD STRIP.AUTO-MCNC: 112 MG/DL (ref 70–110)

## 2017-05-18 PROCEDURE — 82962 GLUCOSE BLOOD TEST: CPT

## 2017-05-18 PROCEDURE — 74011250637 HC RX REV CODE- 250/637: Performed by: HOSPITALIST

## 2017-05-18 PROCEDURE — 97535 SELF CARE MNGMENT TRAINING: CPT

## 2017-05-18 PROCEDURE — 97530 THERAPEUTIC ACTIVITIES: CPT

## 2017-05-18 PROCEDURE — 74011250636 HC RX REV CODE- 250/636: Performed by: FAMILY MEDICINE

## 2017-05-18 PROCEDURE — 74011000258 HC RX REV CODE- 258: Performed by: FAMILY MEDICINE

## 2017-05-18 PROCEDURE — 65660000000 HC RM CCU STEPDOWN

## 2017-05-18 PROCEDURE — 74011250636 HC RX REV CODE- 250/636: Performed by: NURSE PRACTITIONER

## 2017-05-18 PROCEDURE — 74011000250 HC RX REV CODE- 250: Performed by: FAMILY MEDICINE

## 2017-05-18 PROCEDURE — C9113 INJ PANTOPRAZOLE SODIUM, VIA: HCPCS | Performed by: FAMILY MEDICINE

## 2017-05-18 PROCEDURE — 74011250637 HC RX REV CODE- 250/637: Performed by: INTERNAL MEDICINE

## 2017-05-18 PROCEDURE — 74011250637 HC RX REV CODE- 250/637: Performed by: FAMILY MEDICINE

## 2017-05-18 RX ADMIN — Medication 1 TABLET: at 09:24

## 2017-05-18 RX ADMIN — SEVELAMER CARBONATE 800 MG: 800 TABLET, FILM COATED ORAL at 21:23

## 2017-05-18 RX ADMIN — BRINZOLAMIDE 1 DROP: 10 SUSPENSION/ DROPS OPHTHALMIC at 10:00

## 2017-05-18 RX ADMIN — ASPIRIN 325 MG ORAL TABLET 325 MG: 325 PILL ORAL at 09:24

## 2017-05-18 RX ADMIN — SODIUM CHLORIDE 40 MG: 9 INJECTION INTRAMUSCULAR; INTRAVENOUS; SUBCUTANEOUS at 09:25

## 2017-05-18 RX ADMIN — GABAPENTIN 400 MG: 400 CAPSULE ORAL at 09:25

## 2017-05-18 RX ADMIN — SEVELAMER CARBONATE 800 MG: 800 TABLET, FILM COATED ORAL at 09:24

## 2017-05-18 RX ADMIN — SEVELAMER CARBONATE 800 MG: 800 TABLET, FILM COATED ORAL at 17:48

## 2017-05-18 RX ADMIN — TIMOLOL MALEATE 1 DROP: 5 SOLUTION OPHTHALMIC at 17:50

## 2017-05-18 RX ADMIN — BRINZOLAMIDE 1 DROP: 10 SUSPENSION/ DROPS OPHTHALMIC at 17:50

## 2017-05-18 RX ADMIN — FOLIC ACID: 5 INJECTION, SOLUTION INTRAMUSCULAR; INTRAVENOUS; SUBCUTANEOUS at 17:48

## 2017-05-18 RX ADMIN — ENOXAPARIN SODIUM 30 MG: 40 INJECTION SUBCUTANEOUS at 04:37

## 2017-05-18 RX ADMIN — BRIMONIDINE TARTRATE 1 DROP: 2 SOLUTION/ DROPS OPHTHALMIC at 10:05

## 2017-05-18 RX ADMIN — TIMOLOL MALEATE 1 DROP: 5 SOLUTION OPHTHALMIC at 10:10

## 2017-05-18 RX ADMIN — ATORVASTATIN CALCIUM 80 MG: 40 TABLET, FILM COATED ORAL at 21:23

## 2017-05-18 RX ADMIN — PRAVASTATIN SODIUM 2 TABLET: 20 TABLET ORAL at 21:23

## 2017-05-18 RX ADMIN — BRIMONIDINE TARTRATE 1 DROP: 2 SOLUTION/ DROPS OPHTHALMIC at 17:49

## 2017-05-18 RX ADMIN — SODIUM CHLORIDE 40 MG: 9 INJECTION INTRAMUSCULAR; INTRAVENOUS; SUBCUTANEOUS at 21:05

## 2017-05-18 NOTE — PROGRESS NOTES
Progress Note      Patient: Yudi Johnston               Sex: female          DOA: 5/14/2017       YOB: 1945      Age:  74 y.o.        LOS:  LOS: 2 days               Subjective:   Pt had a change of mental status in which she was more drowsy and her speech was more dysarthric . The ct scan of the head was unchanged as expected . Whether she needs an mri will be as per neurology . The plan will be to send her to rehab so that she can walk and to have speech therapy . Pt is frail . Objective:      Visit Vitals    BP (!) 136/37    Pulse 82    Temp 98 °F (36.7 °C)    Resp 16    Wt 60 kg (132 lb 3.2 oz)    SpO2 98%    BMI 20.71 kg/m2       Physical Exam:  Pt is awake and says she is having more difficulty with her speech   Heart reg rate and rhythm  Lungs good breath sounds herd   Abdomen soft and nontender   Neuro some dysarthria when seen this am ,.was also more drowsy the patient says she is unable to walk      Lab/Data Reviewed:  CMP: No results found for: NA, K, CL, CO2, AGAP, GLU, BUN, CREA, GFRAA, GFRNA, CA, MG, PHOS, ALB, TBIL, TP, ALB, GLOB, AGRAT, SGOT, ALT, GPT  CBC: No results found for: WBC, HGB, HGBEXT, HCT, HCTEXT, PLT, PLTEXT, HGBEXT, HCTEXT, PLTEXT        Assessment/Plan     Principal Problem:    Acute ischemic stroke ([facility] [address] (5/16/2017) pt is s/p new onset of drowsiness and increased dysarthria . Active Problems:    Encephalopathy (5/15/2017)  esrd       Plan: pt will be followed . She may need an mri  Of the head .  Will discuss with neurology

## 2017-05-18 NOTE — CONSULTS
Neurology progress Note          Admit Date: 5/14/2017  Length of Stay: 3  Primary Care: Ronda Aaron MD   Principle Problem: Acute ischemic stroke [facility])     Assessment/ plan   1. Stroke: acutely involving the left insular cortex with mild dysphasia  Currently on ASA for antiplatelet therapy. Atorvastatin 80mg . Lovenox for VTE. Manage BP per stroke protocol. Avoid lowering BP during dialysis. Cardiac monitoring . Speech therapy. 2. ESRD: on HD per renal.           History: Alondra Méndez is a 73 yo AA female with PMH of DM, HTN, stroke, ESRD, and arthritis who presented to the ED with difficulty expressing herself. She till has sometrouble bringing  out her words and feels frustrated at times. She has no focal weakness. MRA showed Moderate appearing approximately 60% diameter stenosis proximal left ICA. Brain MRA was unremarkable. MRI Results (most recent):    Results from [place] encounter on 05/14/17   MRA NECK WO CONT   Narrative History: Slurred speech, facial droop, right leg weakness, CVA    PROCEDURE: 2-D and 3-D time-of-flight noncontrast MRA neck performed with MIP  reconstructions obtained. FINDINGS: The visualized mid and distal cervical portions of vertebral arteries  and proximal visualized intradural vertebral arteries appear diffusely small but  patent. No evidence of significant distal common carotid artery stenosis, not  fully included proximally. No evidence of significant proximal right ICA  stenosis. There is eccentric plaque proximal left ICA narrowing the lumen, with  approximate residual luminal diameter 1.4 mm compared to normal caliber cervical  ICA 3.5 mm representing 60% diameter stenosis by NASCET criteria. Impression IMPRESSION:    1. Somewhat limited evaluation due to noncontrast study. 2. Moderate appearing approximately 60% diameter stenosis proximal left ICA.   Correlation to noninvasive carotid Doppler study recommended. 3. No other significant carotid or vertebral stenosis. Latest Hemoglobin A1C:  Lab Results   Component Value Date/Time    Hemoglobin A1c 5.9 05/15/2017 08:35 AM       Latest Cardiology Procedure:  Results for orders placed or performed during the hospital encounter of 05/14/17   EKG, 12 LEAD, INITIAL   Result Value Ref Range    Ventricular Rate 79 BPM    Atrial Rate 79 BPM    P-R Interval 162 ms    QRS Duration 90 ms    Q-T Interval 420 ms    QTC Calculation (Bezet) 481 ms    Calculated P Axis 62 degrees    Calculated R Axis 6 degrees    Calculated T Axis 25 degrees    Diagnosis       Normal sinus rhythm  Normal ECG  When compared with ECG of 15-JULIANE-2016 10:56,  Questionable change in QRS axis  Confirmed by Guero Kenny ([id number]) on 5/16/2017 7:52:21 AM         Important Labs:  No results found for: FOL, RBCF  Lab Results   Component Value Date/Time    Cholesterol, total 98 05/15/2017 08:35 AM    HDL Cholesterol 60 05/15/2017 08:35 AM    LDL, calculated 26.4 05/15/2017 08:35 AM    VLDL, calculated 11.6 05/15/2017 08:35 AM    Triglyceride 58 05/15/2017 08:35 AM    CHOL/HDL Ratio 1.6 05/15/2017 08:35 AM     Lab Results   Component Value Date/Time    TSH 0.85 05/15/2017 08:35 AM    Triiodothyronine (T3), free 2.1 05/15/2017 08:35 AM    T4, Free 0.9 05/15/2017 08:35 AM           PMH:   Past Medical History:   Diagnosis Date    Anemia NEC     CAD (coronary artery disease)     Congestive heart failure, unspecified     Diabetes ([facility] [address]     Dyslipidemia     ESRD (end stage renal disease) on dialysis ([facility] [address]     mw, arlen [facility], Dr. Niecy Rivera.      GERD (gastroesophageal reflux disease)     Glaucoma     HTN (hypertension)     Kidney failure     LVH (left ventricular hypertrophy)     Neuropathy             FH:   Family History   Problem Relation Age of Onset    Hypertension Other     Heart Disease Other     Diabetes Other     Diabetes Other     Heart Disease Other     Hypertension Other         SH:   Social History     Social History    Marital status:      Spouse name: N/A    Number of children: N/A    Years of education: N/A     Social History Main Topics    Smoking status: Never Smoker    Smokeless tobacco: Never Used    Alcohol use No    Drug use: No    Sexual activity: Not Asked     Other Topics Concern    None     Social History Narrative          Vital Signs:   Visit Vitals    /71 (BP 1 Location: Left arm, BP Patient Position: At rest)    Pulse 75    Temp 98.2 °F (36.8 °C)    Resp 16    Wt 58.2 kg (128 lb 4.8 oz)    SpO2 100%    BMI 20.09 kg/m2      Neurological examination:    Appearance: In no acute distress, well developed, cooperative   Cardiovascular:  AV fistula RUE    Mental status examination: Alert and oriented. No dysarthria and mild dysphasia. .   Cranial Nerves:     I: smell    II: visual fields    II: pupils Equal, round, reactive to light   III,VII: ptosis none   III,IV,VI: extraocular muscles  Full ROM   V: facial light touch sensation  normal   V,VII: corneal reflex     VII: facial muscle function  normal   VIII: hearing    IX: soft palate elevation     IX,X: gag reflex    XI: sternocleidomastoid strength    XII: tongue strength  normal        Motor exam: Station, gait:  deferred. No trouble lift up arms and able to briefly raise up her legs.  Sensory: Intact to LT bilaterally .        Coordination:  finger to nose testing is normal          Medications:      [x] REVIEWED  Current Facility-Administered Medications   Medication    B complex-vitamin C-folic acid (NEPHRO-KATHYA) 0.8 mg tab    ondansetron (ZOFRAN) injection 1 mg    aspirin (ASPIRIN) tablet 325 mg    atorvastatin (LIPITOR) tablet 80 mg    acetaminophen (TYLENOL) tablet 650 mg    acetaminophen (TYLENOL) suppository 650 mg    senna-docusate (PERICOLACE) 8.6-50 mg per tablet 2 Tab    pantoprazole (PROTONIX) 40 mg in sodium chloride 0.9 % 10 mL injection  albuterol (PROVENTIL VENTOLIN) nebulizer solution 2.5 mg    folic acid (FOLVITE) 1 mg, thiamine (B-1) 100 mg in 0.9% sodium chloride 50 mL ivpb    brinzolamide (AZOPT) 1 % ophthalmic suspension 1 Drop    brimonidine (ALPHAGAN) 0.2 % ophthalmic solution 1 Drop    sevelamer carbonate (RENVELA) tab 800 mg    0.9% sodium chloride infusion    albumin human 25% (BUMINATE) solution 12.5 g    timolol (TIMOPTIC) 0.5 % ophthalmic solution 1 Drop    enoxaparin (LOVENOX) injection 30 mg    gabapentin (NEURONTIN) capsule 400 mg     Data:      [x] REVIEWED  Recent Results (from the past 24 hour(s))   GLUCOSE, POC    Collection Time: 05/18/17 12:19 AM   Result Value Ref Range    Glucose (POC) 103 70 - 110 mg/dL     Problem List:   Principal Problem:    Acute ischemic stroke ([facility] [address] (5/16/2017)    Active Problems:    Encephalopathy (5/15/2017)  Alyson Tee MD

## 2017-05-18 NOTE — ROUTINE PROCESS
Bedside / verbal shift change report given to [facility] EDIN LYLES  (oncoming nurse) by Moreno Barrera RN (offgoing nurse). Report included the following information SBAR, Kardex, Intake/Output, MAR and Recent Results.

## 2017-05-18 NOTE — PROGRESS NOTES
RENAL PROGRESS NOTE        Azra Srinivasan       Renal Progress Note  Follow up of ESRD      Assessment/Plan:  1. ESRD. Stable volume status, continue HD  mwf. 2. HTN. avoid too tight bp control given acute cva.   3. Anemia of ESRD. H/H is above goal. No need for frank at this time. 4. Secondary hyperparathyroidism of ESRD. Continue phos binder. 5. New cva. Neurology on the case. Improving clinically.                                                                                                                                   Subjective:  Still disarthric and has weakness on left  Mildly confused       Patient Active Problem List   Diagnosis Code    Diabetes ([facility] E11.9    HTN (hypertension) I10    Glaucoma H40.9    Kidney failure N19    S/P breast biopsy, left Z98.890    Fibrosis, breast N60.39    ESRD on dialysis ([facility] N18.6, Z99.2    Chest pain R07.9    Abnormal EKG R94.31    Encephalopathy G93.40    Acute ischemic stroke ([facility] I63.9       Current Facility-Administered Medications   Medication Dose Route Frequency Provider Last Rate Last Dose    B complex-vitamin C-folic acid (NEPHRO-KATHYA) 0.8 mg tab  1 Tab Oral DAILY Sarthak Gimenez MD        ondansetron [facility]) injection 1 mg  1 mg IntraVENous Q6H PRN Eloina Ferro MD        aspirin (ASPIRIN) tablet 325 mg  325 mg Oral DAILY Eloina Ferro MD   325 mg at 05/17/17 0911    atorvastatin (LIPITOR) tablet 80 mg  80 mg Oral QHS Eloina Ferro MD   80 mg at 05/17/17 2202    acetaminophen (TYLENOL) tablet 650 mg  650 mg Oral Q4H PRN China Johnson MD        acetaminophen (TYLENOL) suppository 650 mg  650 mg Rectal Q4H PRN China Johnson MD        senna-docusate (PERICOLACE) 8.6-50 mg per tablet 2 Tab  2 Tab Oral QHS Eloina Ferro MD   2 Tab at 05/17/17 2202    pantoprazole (PROTONIX) 40 mg in sodium chloride 0.9 % 10 mL injection  40 mg IntraVENous Q12H China Delia Lainez MD   40 mg at 05/17/17 2202    albuterol (PROVENTIL VENTOLIN) nebulizer solution 2.5 mg  2.5 mg Nebulization Q4H PRN Malina Moon MD        folic acid (FOLVITE) 1 mg, thiamine (B-1) 100 mg in 0.9% sodium chloride 50 mL ivpb   IntraVENous DAILY Ifeanyichukwu Delia Lainez MD        brinzolamide (AZOPT) 1 % ophthalmic suspension 1 Drop  1 Drop Both Eyes BID Malina Moon MD   1 Drop at 05/17/17 0916    brimonidine (ALPHAGAN) 0.2 % ophthalmic solution 1 Drop  1 Drop Both Eyes BID Malina Moon MD   1 Drop at 05/17/17 0916    sevelamer carbonate (RENVELA) tab 800 mg  800 mg Oral TID Malina Moon MD   800 mg at 05/17/17 2202    0.9% sodium chloride infusion  50 mL/hr IntraVENous DIALYSIS PRRODRIGO Logan MD   Stopped at 05/15/17 1158    albumin human 25% (BUMINATE) solution 12.5 g  12.5 g IntraVENous DIALYSIS PRN Jessie Logan MD        timolol (TIMOPTIC) 0.5 % ophthalmic solution 1 Drop  1 Drop Both Eyes BID Gloria Dumont MD   1 Drop at 05/17/17 0916    enoxaparin (LOVENOX) injection 30 mg  30 mg SubCUTAneous Q24H Rubina Joy NP   30 mg at 05/18/17 0437    gabapentin (NEURONTIN) capsule 400 mg  400 mg Oral DAILY Viri BURCH MD   400 mg at 05/17/17 0911       Objective  Vitals:    05/17/17 2046 05/17/17 2248 05/18/17 0220 05/18/17 0513   BP:  133/73 106/49 105/48   Pulse:  69 93 85   Resp:  16 18 18   Temp:  98.6 °F (37 °C) 98.7 °F (37.1 °C) 98.3 °F (36.8 °C)   TempSrc:       SpO2:  98% 95% 96%   Weight: 58.2 kg (128 lb 4.8 oz)          No intake or output data in the 24 hours ending 05/18/17 0914        Admission weight: Weight: 64.9 kg (143 lb) (05/14/17 2221)  Last Weight Metrics:  Weight Loss Metrics 5/17/2017 4/25/2017 1/14/2016 10/31/2012 9/15/2010   Today's Wt 128 lb 4.8 oz 145 lb 136 lb 1.6 oz 151 lb 179 lb   BMI 20.09 kg/m2 22.05 kg/m2 20.7 kg/m2 22.96 kg/m2 27.22 kg/m2             Physical Assessment:     General: NAD, alert and oriented. Neck: No jvd. LUNGS: Clear to Auscultation, No rales, rhonchi or wheezes. CVS EXM: S1, S2  RRR, no murmurs/gallops/rubs. Abdomen: soft, non tender. Lower Extremities:  no edema. Access : AVF      Lab    CBC w/Diff Recent Labs      05/16/17   0510   WBC  5.3   RBC  4.83   HGB  12.8   HCT  39.4   PLT  120*   GRANS  62   LYMPH  21   EOS  5        Chemistry Recent Labs      05/16/17   0510   GLU  56*   NA  136   K  4.1   CL  99*   CO2  29   BUN  14   CREA  4.29*   CA  8.2*   AGAP  8   BUCR  3*   AP  114   TP  5.9*   ALB  2.8*   GLOB  3.1   AGRAT  0.9         No results found for: IRON, FE, TIBC, IBCT, PSAT, FERR   Lab Results   Component Value Date/Time    Calcium 8.2 05/16/2017 05:10 AM        Retta Sandhoff, M.D.   [facility]  Phone [phone]

## 2017-05-18 NOTE — PROGRESS NOTES
Progress Note      Patient: Breann Hardy               Sex: female          DOA: 5/14/2017       YOB: 1945      Age:  74 y.o.        LOS:  LOS: 3 days               Subjective:   Pt is doing well and is slowly improving. the plan is to transfer the pt to a snf for rehab and speech therapy . The blood pressure is being autoregulated . Objective:      Visit Vitals    /60 (BP 1 Location: Left arm, BP Patient Position: Sitting)    Pulse 83    Temp 98.4 °F (36.9 °C)    Resp 16    Wt 58.2 kg (128 lb 4.8 oz)    SpO2 100%    BMI 20.09 kg/m2       Physical Exam:  Pt is awake and her speech is stil dysatrhric but improved   Heart reg rqte and rhythm   Lungs good breath sounds heard   Abdomen soft and nontender   neuro difficulty in walking and dysarthria still present       Lab/Data Reviewed:  CMP: No results found for: NA, K, CL, CO2, AGAP, GLU, BUN, CREA, GFRAA, GFRNA, CA, MG, PHOS, ALB, TBIL, TP, ALB, GLOB, AGRAT, SGOT, ALT, GPT  CBC: No results found for: WBC, HGB, HGBEXT, HCT, HCTEXT, PLT, PLTEXT, HGBEXT, HCTEXT, PLTEXT        Assessment/Plan     Principal Problem:    Acute ischemic stroke (HCC) (5/16/2017) in the left insular cortex hospital/o chronic  Cortical infarcts in the left frontal lobe and  Precentral gyrus with additional infarcts  In the basal ganglia  .      Active Problems:    Encephalopathy (5/15/2017)  Dysarthria .  esrd    Plan:will need to go to a snf for rehaqb and speech therapy

## 2017-05-18 NOTE — PROGRESS NOTES
NUTRITION follow-up/Plan of care    RECOMMENDATIONS:   1. Dental Soft, 2gm Na, No concentrated sweets, SF CIB with all meals  2. Monitor weight and PO intake  3. RD to follow    GOALS:   1. Ongoing: PO intake meets >75% of protein/calorie needs by 5/23  2. Met/ Ongoing: Maintain weight (+/- 1-2 lb) by    ASSESSMENT:    BMI of 20.1 is classified as at nutrition risk in pt > 73 y/o. ERSD with dialysis K+, WNL. Nutrition recommendations listed. RD to follow. Nutrition Risk:  []   High [x]  Moderate [] Low    SUBJECTIVE/OBJECTIVE:     I am eating much better, I ate all my breakfast except . .., pt having trouble with word finding. Information Obtained From:   [x] Chart Review  [x] Patient  [] Family/Caregiver  [] Nurse/Physician   [] Patient Rounds/Interdisciplinary Meeting    Diet: Dental Soft, 2gm Na, No concentrated sweets, SF CIB with all meals  Patient Vitals for the past 100 hrs:   % Diet Eaten   05/16/17 0821 25 %   05/15/17 2023 25 %   05/15/17 0946 25 %     Medications: [x] Reviewed   Encounter Diagnoses     ICD-10-CM ICD-9-CM   1. Transient cerebral ischemia, unspecified type G45.9 435.9     Past Medical History:   Diagnosis Date    Anemia NEC     CAD (coronary artery disease)     Congestive heart failure, unspecified     Diabetes ([facility]     Dyslipidemia     ESRD (end stage renal disease) on dialysis ([facility])     [facility] Dr. Josue Julio.      GERD (gastroesophageal reflux disease)     Glaucoma     HTN (hypertension)     Kidney failure     LVH (left ventricular hypertrophy)     Neuropathy      Labs:  Lab Results   Component Value Date/Time    Sodium 136 05/16/2017 05:10 AM    Potassium 4.1 05/16/2017 05:10 AM    Chloride 99 05/16/2017 05:10 AM    CO2 29 05/16/2017 05:10 AM    Anion gap 8 05/16/2017 05:10 AM    Glucose 56 05/16/2017 05:10 AM    BUN 14 05/16/2017 05:10 AM    Creatinine 4.29 05/16/2017 05:10 AM    Calcium 8.2 05/16/2017 05:10 AM    Albumin 2.8 05/16/2017 05:10 AM Anthropometrics: BMI (calculated): 20.1 Last 3 Recorded Weights in this Encounter    05/15/17 2132 05/16/17 0534 05/17/17 2046   Weight: 60.2 kg (132 lb 11.2 oz) 60 kg (132 lb 3.2 oz) 58.2 kg (128 lb 4.8 oz)    Ht Readings from Last 1 Encounters:   05/15/17 5' 7\" (1.702 m)     []  Weight Loss  []  Weight Gain  [x]  Weight Stable   []  New wt n/a on record           Nutrition Problems Identified:   [x] Suboptimal PO intake   [] Food Allergies  [] Difficulty chewing/swallowing/poor dentition  [] Constipation/Diarrhea   [] Nausea/Vomiting   [] None  [] Other:     Plan:   [x] Therapeutic Diet  []  Obtained/adjusted food preferences/tolerances and/or snacks options   []  Supplements added   [] Occupational therapy following for feeding techniques  []  HS snack added   []  Modify diet texture   []  Modify diet for food allergies   []  Educate patient   []  Assist with menu selection   [x]  Monitor PO intake on meal rounds   [x]  Continue inpatient monitoring and intervention   []  Participated in discharge planning/Interdisciplinary rounds/Team meetings   []  Other:     Education Needs:   [x] Not appropriate for teaching at this time due to:   [] Identified and addressed    Nutrition Monitoring and Evaluation:   [x] Continue inpatient monitoring and interventions    [] Other:     Juli Boyce  Pager: [phone]

## 2017-05-18 NOTE — PROGRESS NOTES
1055:  Pt sitting EOB after OT. Refusing PT at this time stating she wants to sit longer. 1342:  2nd attempt pt in bed s/p eating lunch. Refusing to participate in PT. Will follow up as schedule permits. 1451:  3rd attempt, CNA currently bathing pt.

## 2017-05-18 NOTE — PROGRESS NOTES
conducted a Follow up consultation and Spiritual Assessment for Kaylene Suero, who is a 74 y.o.,female. The  provided the following Interventions:  Continued the relationship of care and support. Listened empathically. Offered prayer and assurance of continued prayer on patients behalf. Chart reviewed. The following outcomes were achieved:  Patient expressed gratitude for pastoral care visit. Assessment:  There are no further spiritual or U[place] issues which require Spiritual Care Services interventions at this time. Plan:  Chaplains will continue to follow and will provide pastoral care on an as needed/requested basis.  recommends bedside caregivers page  on duty if patient shows signs of acute spiritual or emotional distress.      [facility]   Staff [address] [place] [address]   [phone]

## 2017-05-18 NOTE — ROUTINE PROCESS
Assumed care of patient at 2015 report received from Stefani VELAZQUEZ RN. Received pt in bed eating dinner, family present at the bed side. Denies pain and or distress. Call bell within reach. 5/18/17 - 0750 - Bedside and Verbal shift change report given to Brendan Cool RN (oncoming nurse) by Ravi Fountain RN BSN (offgoing nurse). Report given with SBAR, Kardex, Intake/Output, MAR and Recent Results.

## 2017-05-18 NOTE — PROGRESS NOTES
Problem: Self Care Deficits Care Plan (Adult)  Goal: *Acute Goals and Plan of Care (Insert Text)  Occupational Therapy Goals  Initiated 5/15/2017 within 7 day(s). 1. Patient will perform grooming tasks at EOB with supervision for balance. 2. Patient will perform upper body dressing with supervision and minimal verbal cues to maintain midline in sitting. 3. Patient will perform functional task in standing for 3 minutes with minimal assistance to increase activity tolerance for ADLs. 4. Patient will perform toilet transfers with moderate assistance . 5. Patient will perform all aspects of toileting with moderate assistance . 6. Patient will participate in upper extremity therapeutic exercise/activities with minimal assistance for 8 minutes to increase/maintain strength/AROM of BUEs for ADLs. 7. Patient will utilize energy conservation techniques during functional activities with minimal verbal cues. Outcome: Progressing Towards Goal  OCCUPATIONAL THERAPY TREATMENT     Patient: Domonique Hussein (89 y.o. female)  Date: 5/18/2017  Diagnosis: Encephalopathy Acute ischemic stroke [facility])       Precautions: Fall  Chart, occupational therapy assessment, plan of care, and goals were reviewed. ASSESSMENT:  Pt is pleasant and cooperative, oriented x 3, agreeable to therapy. Pt generalized weakness requires Max Assist w/bed mobility to EOB and maintains EOB sitting balance w/close guarding. Pt requires increase time and SBA w/ADL grooming tasks. Pt requires 2 person assist w/sit to stand transfer and increase encouragement. Pt declines OOB to chair despite explanation of benefits and safety. Alerted NSG to pt EOB.   EDUCATION Pt educated on energy conservation techniques w/ADL grooming tasks  Progression toward goals:  [ ]          Improving appropriately and progressing toward goals  [X]          Improving slowly and progressing toward goals  [ ]          Not making progress toward goals and plan of care will be adjusted       PLAN:  Patient continues to benefit from skilled intervention to address the above impairments. Continue treatment per established plan of care. Discharge Recommendations:  Skilled Nursing Facility  Further Equipment Recommendations for Discharge:   TBD by SNF staff       SUBJECTIVE:   Patient stated I just want to stay on the side of the bed.       OBJECTIVE DATA SUMMARY:         Cognitive/Behavioral Status:  Neurologic State: Alert  Orientation Level: Oriented to person, Oriented to place  Cognition: Appropriate for age attention/concentration, Follows commands  Safety/Judgement: Fall prevention  Functional Mobility and Transfers for ADLs:              Bed Mobility:  Supine to Sit: Maximum assistance              Transfers:  Sit to Stand: Maximum assistance;Assist x2  Balance:  Sitting: Impaired  Sitting - Static: Fair (occasional)  Sitting - Dynamic: Fair (occasional)  Standing: Impaired  Standing - Static: Poor  Standing - Dynamic : None  ADL Intervention:  Grooming  Washing Face: Supervision/set-up  Washing Hands: Supervision/set-up  Brushing Teeth: Supervision/set-up     Lower Body Dressing Assistance  Socks: Total assistance (dependent)  Leg Crossed Method Used: No  Position Performed: Supine  Cues: Don     Therapeutic Exercises:   AAROM > AROM BUE scapula elevation/depression, ab/adduction     Pain:  Pre Treatment:0  Post Treatment:0  Pt c/o back and BLE (knee) pain in standing     Activity Tolerance:    Fair     Please refer to the flowsheet for vital signs taken during this treatment.   After treatment:   [ ]  Patient left in no apparent distress sitting up in chair  [X]  Patient left in no apparent distress seated EOB  [X]  Call bell left within reach  [X]  Nursing notified  [ ]  Caregiver present  [ ]  Bed alarm activated     [place]  Time Calculation: 35 mins

## 2017-05-18 NOTE — INTERDISCIPLINARY ROUNDS
IDR Summary      Patient: Fox Mitchell MRN: [id number]    Age: 74 y.o.  : 1945     Admit Diagnosis: Encephalopathy      Problems pertinent to hospital stay:     Consults:P.T, O.T. and Case Management     Testing due for patient today?  NO    Nutrition plan:Yes     Mobility needs: Yes      Lines/Tubes:   IV: YES   Needed: YES  Galindo: NO  Needed:NO  Central Line: NO Needed: NO      VTE Prophylaxis: Chemical            Care Management:  Discharge plan: SNF    PCP: Isidro Sage MD    : NO  Financial concerns:No   Interventions:       LOS: 3 days     Expected days until discharge: today or tomorrow        Signed:     JACY Headley  [address]  Hospitalist Division  Pager:  [phone]  Office:  [phone]

## 2017-05-19 LAB
ALBUMIN SERPL BCP-MCNC: 2.7 G/DL (ref 3.4–5)
ALBUMIN/GLOB SERPL: 0.9 {RATIO} (ref 0.8–1.7)
ALP SERPL-CCNC: 108 U/L (ref 45–117)
ALT SERPL-CCNC: 17 U/L (ref 13–56)
ANION GAP BLD CALC-SCNC: 10 MMOL/L (ref 3–18)
AST SERPL W P-5'-P-CCNC: 27 U/L (ref 15–37)
BASOPHILS # BLD AUTO: 0 K/UL (ref 0–0.06)
BASOPHILS # BLD: 0 % (ref 0–2)
BILIRUB SERPL-MCNC: 0.6 MG/DL (ref 0.2–1)
BUN SERPL-MCNC: 32 MG/DL (ref 7–18)
BUN/CREAT SERPL: 6 (ref 12–20)
CALCIUM SERPL-MCNC: 8.8 MG/DL (ref 8.5–10.1)
CHLORIDE SERPL-SCNC: 97 MMOL/L (ref 100–108)
CO2 SERPL-SCNC: 26 MMOL/L (ref 21–32)
CREAT SERPL-MCNC: 5.13 MG/DL (ref 0.6–1.3)
DIFFERENTIAL METHOD BLD: ABNORMAL
EOSINOPHIL # BLD: 0.3 K/UL (ref 0–0.4)
EOSINOPHIL NFR BLD: 7 % (ref 0–5)
ERYTHROCYTE [DISTWIDTH] IN BLOOD BY AUTOMATED COUNT: 17.6 % (ref 11.6–14.5)
GLOBULIN SER CALC-MCNC: 3 G/DL (ref 2–4)
GLUCOSE BLD STRIP.AUTO-MCNC: 117 MG/DL (ref 70–110)
GLUCOSE BLD STRIP.AUTO-MCNC: 170 MG/DL (ref 70–110)
GLUCOSE SERPL-MCNC: 61 MG/DL (ref 74–99)
HCT VFR BLD AUTO: 37.1 % (ref 35–45)
HGB BLD-MCNC: 11.9 G/DL (ref 12–16)
LYMPHOCYTES # BLD AUTO: 28 % (ref 21–52)
LYMPHOCYTES # BLD: 1.4 K/UL (ref 0.9–3.6)
MCH RBC QN AUTO: 26.5 PG (ref 24–34)
MCHC RBC AUTO-ENTMCNC: 32.1 G/DL (ref 31–37)
MCV RBC AUTO: 82.6 FL (ref 74–97)
MONOCYTES # BLD: 0.7 K/UL (ref 0.05–1.2)
MONOCYTES NFR BLD AUTO: 13 % (ref 3–10)
NEUTS SEG # BLD: 2.5 K/UL (ref 1.8–8)
NEUTS SEG NFR BLD AUTO: 52 % (ref 40–73)
PLATELET # BLD AUTO: 115 K/UL (ref 135–420)
PMV BLD AUTO: 10.6 FL (ref 9.2–11.8)
POTASSIUM SERPL-SCNC: 3.6 MMOL/L (ref 3.5–5.5)
POTASSIUM SERPL-SCNC: 6.2 MMOL/L (ref 3.5–5.5)
PROT SERPL-MCNC: 5.7 G/DL (ref 6.4–8.2)
RBC # BLD AUTO: 4.49 M/UL (ref 4.2–5.3)
SODIUM SERPL-SCNC: 133 MMOL/L (ref 136–145)
WBC # BLD AUTO: 5 K/UL (ref 4.6–13.2)

## 2017-05-19 PROCEDURE — 97530 THERAPEUTIC ACTIVITIES: CPT

## 2017-05-19 PROCEDURE — 82962 GLUCOSE BLOOD TEST: CPT

## 2017-05-19 PROCEDURE — 97535 SELF CARE MNGMENT TRAINING: CPT

## 2017-05-19 PROCEDURE — 74011000250 HC RX REV CODE- 250: Performed by: FAMILY MEDICINE

## 2017-05-19 PROCEDURE — 36415 COLL VENOUS BLD VENIPUNCTURE: CPT | Performed by: HOSPITALIST

## 2017-05-19 PROCEDURE — 90935 HEMODIALYSIS ONE EVALUATION: CPT

## 2017-05-19 PROCEDURE — 74011250637 HC RX REV CODE- 250/637: Performed by: INTERNAL MEDICINE

## 2017-05-19 PROCEDURE — C9113 INJ PANTOPRAZOLE SODIUM, VIA: HCPCS | Performed by: FAMILY MEDICINE

## 2017-05-19 PROCEDURE — 84132 ASSAY OF SERUM POTASSIUM: CPT | Performed by: HOSPITALIST

## 2017-05-19 PROCEDURE — 74011250637 HC RX REV CODE- 250/637: Performed by: HOSPITALIST

## 2017-05-19 PROCEDURE — 74011250636 HC RX REV CODE- 250/636: Performed by: FAMILY MEDICINE

## 2017-05-19 PROCEDURE — 74011250637 HC RX REV CODE- 250/637: Performed by: FAMILY MEDICINE

## 2017-05-19 PROCEDURE — 85025 COMPLETE CBC W/AUTO DIFF WBC: CPT | Performed by: HOSPITALIST

## 2017-05-19 PROCEDURE — 65660000000 HC RM CCU STEPDOWN

## 2017-05-19 PROCEDURE — 74011000258 HC RX REV CODE- 258: Performed by: FAMILY MEDICINE

## 2017-05-19 PROCEDURE — 74011250636 HC RX REV CODE- 250/636: Performed by: NURSE PRACTITIONER

## 2017-05-19 RX ORDER — LIDOCAINE 50 MG/G
2 PATCH TOPICAL EVERY 24 HOURS
Status: DISCONTINUED | OUTPATIENT
Start: 2017-05-19 | End: 2017-05-20 | Stop reason: HOSPADM

## 2017-05-19 RX ADMIN — ACETAMINOPHEN 650 MG: 325 TABLET, FILM COATED ORAL at 19:59

## 2017-05-19 RX ADMIN — SEVELAMER CARBONATE 800 MG: 800 TABLET, FILM COATED ORAL at 09:38

## 2017-05-19 RX ADMIN — TIMOLOL MALEATE 1 DROP: 5 SOLUTION OPHTHALMIC at 09:50

## 2017-05-19 RX ADMIN — Medication 1 TABLET: at 09:39

## 2017-05-19 RX ADMIN — SEVELAMER CARBONATE 800 MG: 800 TABLET, FILM COATED ORAL at 22:45

## 2017-05-19 RX ADMIN — GABAPENTIN 400 MG: 400 CAPSULE ORAL at 09:38

## 2017-05-19 RX ADMIN — FOLIC ACID: 5 INJECTION, SOLUTION INTRAMUSCULAR; INTRAVENOUS; SUBCUTANEOUS at 17:13

## 2017-05-19 RX ADMIN — ASPIRIN 325 MG ORAL TABLET 325 MG: 325 PILL ORAL at 09:38

## 2017-05-19 RX ADMIN — BRIMONIDINE TARTRATE 1 DROP: 2 SOLUTION/ DROPS OPHTHALMIC at 17:19

## 2017-05-19 RX ADMIN — BRINZOLAMIDE 1 DROP: 10 SUSPENSION/ DROPS OPHTHALMIC at 09:39

## 2017-05-19 RX ADMIN — SEVELAMER CARBONATE 800 MG: 800 TABLET, FILM COATED ORAL at 17:14

## 2017-05-19 RX ADMIN — SODIUM CHLORIDE 40 MG: 9 INJECTION INTRAMUSCULAR; INTRAVENOUS; SUBCUTANEOUS at 09:38

## 2017-05-19 RX ADMIN — ENOXAPARIN SODIUM 30 MG: 40 INJECTION SUBCUTANEOUS at 04:03

## 2017-05-19 RX ADMIN — BRIMONIDINE TARTRATE 1 DROP: 2 SOLUTION/ DROPS OPHTHALMIC at 09:39

## 2017-05-19 RX ADMIN — ACETAMINOPHEN 650 MG: 325 TABLET, FILM COATED ORAL at 04:52

## 2017-05-19 RX ADMIN — SODIUM CHLORIDE 40 MG: 9 INJECTION INTRAMUSCULAR; INTRAVENOUS; SUBCUTANEOUS at 22:45

## 2017-05-19 RX ADMIN — TIMOLOL MALEATE 1 DROP: 5 SOLUTION OPHTHALMIC at 17:19

## 2017-05-19 RX ADMIN — ATORVASTATIN CALCIUM 80 MG: 40 TABLET, FILM COATED ORAL at 22:44

## 2017-05-19 RX ADMIN — BRINZOLAMIDE 1 DROP: 10 SUSPENSION/ DROPS OPHTHALMIC at 17:19

## 2017-05-19 NOTE — PROGRESS NOTES
Chart reviewed. Plan remains SNF, has been accepted to [facility] when medically stable. Will cont to follow for further needs. Orly Mejía RN,ext. [phone].

## 2017-05-19 NOTE — ROUTINE PROCESS
Bedside / verbal shift change report given to  Sister yari PAULA (oncoming nurse) by Pedro Conway RN (offgoing nurse). Report included the following information SBAR, Kardex, Intake/Output, MAR and Recent Results.

## 2017-05-19 NOTE — PROGRESS NOTES
Renal Progress Note  Follow up of ESRD     Assessment/Plan:  1. ESRD. Stable volume status. Hyperkalemia was addressed by today's dialysis. Continue dialysis mwf. 2. HTN. Continue to hold zestril, avoid too tight bp control given acute cva.   3. Anemia of ESRD. H/H is above goal. No need for frank at this time. 4. Secondary hyperparathyroidism of ESRD. Continue phos binder. 5. New cva. Neurology on the case. Getting pt. Will f/u on Monday, please call if any questions. Subjective:  Patient complaints off: Feels better. A, Ox3, appropriate. No SOB, chest pain, nausea or vomiting. Appetite is fair.     Patient Active Problem List   Diagnosis Code    Diabetes ([facility] E11.9    HTN (hypertension) I10    Glaucoma H40.9    Kidney failure N19    S/P breast biopsy, left Z98.890    Fibrosis, breast N60.39    ESRD on dialysis ([facility] N18.6, Z99.2    Chest pain R07.9    Abnormal EKG R94.31    Encephalopathy G93.40    Acute ischemic stroke ([facility] I63.9       Current Facility-Administered Medications   Medication Dose Route Frequency Provider Last Rate Last Dose    lidocaine (LIDODERM) 5 % patch 2 Patch  2 Patch TransDERmal Q24H Marco A Morales MD   2 Patch at 05/19/17 1209    B complex-vitamin C-folic acid (NEPHRO-KATHYA) 0.8 mg tab  1 Tab Oral DAILY Marco A Morales MD   1 Tab at 05/19/17 0939    ondansetron (ZOFRAN) injection 1 mg  1 mg IntraVENous Q6H PRN Abby Spivey MD        aspirin (ASPIRIN) tablet 325 mg  325 mg Oral DAILY Abby Spivey MD   325 mg at 05/19/17 0938    atorvastatin (LIPITOR) tablet 80 mg  80 mg Oral QHS Abby Spivey MD   80 mg at 05/18/17 2123    acetaminophen (TYLENOL) tablet 650 mg  650 mg Oral Q4H PRN Abby Spivey MD   650 mg at 05/19/17 0452    acetaminophen (TYLENOL) suppository 650 mg  650 mg Rectal Q4H PRN China Quiroga MD        senna-docusate (PERICOLACE) 8.6-50 mg per tablet 2 Tab  2 Tab Oral QHS Meriel Hatchet, MD   2 Tab at 05/18/17 2123    pantoprazole (PROTONIX) 40 mg in sodium chloride 0.9 % 10 mL injection  40 mg IntraVENous Q12H China Mann MD   40 mg at 05/19/17 0938    albuterol (PROVENTIL VENTOLIN) nebulizer solution 2.5 mg  2.5 mg Nebulization Q4H PRN Meriel Hatchet, MD        folic acid (FOLVITE) 1 mg, thiamine (B-1) 100 mg in 0.9% sodium chloride 50 mL ivpb   IntraVENous DAILY China Mann MD        brinzolamide (AZOPT) 1 % ophthalmic suspension 1 Drop  1 Drop Both Eyes BID Meriel Hatchet, MD   1 Drop at 05/19/17 0939    brimonidine (ALPHAGAN) 0.2 % ophthalmic solution 1 Drop  1 Drop Both Eyes BID Meriel Hatchet, MD   1 Drop at 05/19/17 0939    sevelamer carbonate (RENVELA) tab 800 mg  800 mg Oral TID Meriel Hatchet, MD   800 mg at 05/19/17 1714    0.9% sodium chloride infusion  50 mL/hr IntraVENous DIALYSIS PRN Fernando Cruz MD   Stopped at 05/15/17 1158    albumin human 25% (BUMINATE) solution 12.5 g  12.5 g IntraVENous DIALYSIS PRN Fernando Cruz MD        timolol (TIMOPTIC) 0.5 % ophthalmic solution 1 Drop  1 Drop Both Eyes BID Roshan Fernandez MD   1 Drop at 05/19/17 0950    enoxaparin (LOVENOX) injection 30 mg  30 mg SubCUTAneous Q24H Rubina Joy NP   30 mg at 05/19/17 0403    gabapentin (NEURONTIN) capsule 400 mg  400 mg Oral DAILY Lina BURCH MD   400 mg at 05/19/17 0938             Objective:  Vitals:    05/19/17 0850 05/19/17 0930 05/19/17 1104 05/19/17 1544   BP: 153/58 114/65 113/77 140/67   Pulse: 74 88 84 66   Resp: 18 18 19 18   Temp: 97.3 °F (36.3 °C) 98.5 °F (36.9 °C) 98.7 °F (37.1 °C) 98.2 °F (36.8 °C)   TempSrc: Oral      SpO2:  99% 95% 100%   Weight:         .     Intake/Output Summary (Last 24 hours) at 05/19/17 1715  Last data filed at 05/19/17 0850   Gross per 24 hour   Intake              240 ml   Output             1500 ml   Net            -1260 ml       Admission weight:Weight: 64.9 kg (143 lb) (05/14/17 2221)    Last Weight Metrics:  Weight Loss Metrics 5/19/2017 4/25/2017 1/14/2016 10/31/2012 9/15/2010   Today's Wt 132 lb 1.6 oz 145 lb 136 lb 1.6 oz 151 lb 179 lb   BMI 20.69 kg/m2 22.05 kg/m2 20.7 kg/m2 22.96 kg/m2 27.22 kg/m2            Physical Exam:     General: No acute distress. Neck: no jvd. LUNGS: CTA b.   CVS EXM: S1, S2, RRR. Abdomen: Soft, Non Tender, non distended. Lower Extremities: No Edema. Access: rt arm avg, dressings intact.        Labs:    CBC w/Diff Recent Labs      05/19/17   0409   WBC  5.0   RBC  4.49   HGB  11.9*   HCT  37.1   PLT  115*   GRANS  52   LYMPH  28   EOS  7*        Chemistry Recent Labs      05/19/17   1113  05/19/17   0409   GLU   --   61*   NA   --   133*   K  3.6  6.2*   CL   --   97*   CO2   --   26   BUN   --   32*   CREA   --   5.13*   CA   --   8.8   AGAP   --   10   BUCR   --   6*   AP   --   108   TP   --   5.7*   ALB   --   2.7*   GLOB   --   3.0   AGRAT   --   0.9          No results found for: IRON, FE, TIBC, IBCT, PSAT, FERR   Lab Results   Component Value Date/Time    Calcium 8.8 05/19/2017 04:09 AM          Hope Rankin M.D  [facility]  Office [phone]  Pager [phone]

## 2017-05-19 NOTE — PROGRESS NOTES
Discussed with Dr. Justino Harper whether from a medical standpoint patient able to transfer as [facility] has accepted. She is medically stable per Dr. Justino Harper. Updated Juan Larson from [facility]. She will see if bed still available.

## 2017-05-19 NOTE — PROGRESS NOTES
Neurology Progress Note    Admit Date: 5/14/2017  Length of Stay: 4  Primary Care: Roberto Soto MD     Interim History: No overnight issues. Had dialysis yesterday. No complaints. Assessment:    Principle Problem: Acute ischemic stroke [facility])     Problem List: Principal Problem:    Acute ischemic stroke ([address] (5/16/2017)    Active Problems:    Encephalopathy (5/15/2017)        Plan:    1. AIS- acutely involving the left insular cortex with mild expressive aphasia. Small vessel disease. MRAs essentially benign except for moderate stenosis of LICA. Has been stable neurologically and from this perspective able to transfer to SNF when able. From  note appears to have bed available at [facility]. Currently on ASA for antiplatelet therapy. Atorvastatin 80mg . Lovenox for VTE. Manage BP per stroke protocol- can start lowering to goal. Avoid lowering BP during dialysis. Cardiac monitoring- SR on monitor. Speech therapy for aphasia. Results reviewed:     CT Results (most recent):    Results from [place] encounter on 05/14/17   CT HEAD WO CONT   Narrative EXAM: CT head    INDICATION: Decreased level of consciousness. COMPARISON: 5/14/2017 and correlation brain MRI 5/15/2017. TECHNIQUE: Axial CT imaging of the head  was obtained from skull base to vertex  without intravenous contrast. Coronal and sagittal reconstructions were  obtained. One or more dose reduction techniques were used on this CT: automated exposure  control, adjustment of the mAs and/or kVp according to patient's size, and  iterative reconstruction techniques. The specific techniques utilized on this CT  exam have been documented in the patient's electronic medical record.    _______________    FINDINGS:    BRAIN AND POSTERIOR FOSSA: Stable mild diffuse central and cortical volume loss. There is no intracranial hemorrhage, mass effect, or shift of midline  structures.   Chronic left thalamic lacunar infarct. Stable chronic area of  infarct involving left perisylvian region and posterior left frontal lobe. Stable moderate bilateral deep white matter hypodensity. Stable small chronic  left superior cerebellar infarct. Extensive bilateral carotid siphon  atherosclerosis as well as involving proximal intradural vertebral arteries. Stable calcific focus within the posterior left sylvian fissure. EXTRA-AXIAL SPACES AND MENINGES: There are no abnormal extra-axial fluid  collections. CALVARIUM: No acute osseous abnormality. SINUSES: The visualized portions of the paranasal sinuses and mastoid air cells  are well aerated. OTHER: None.    _______________         Impression IMPRESSION:    1. No new acute intracranial abnormalities are identified. No CT evidence to  suggest acute intracranial hematoma, cortical infarct, or mass effect/mass  lesion. 2. Chronic left perisylvian and posterior frontal infarct, stable calcific  density posterior left sylvian fissure suggestive of either calcified  thromboembolic plaque or atherosclerotic calcification. 3. Additional chronic left thalamic and superior left cerebellar infarcts. Stable bilateral deep white matter hypodensity nonspecific likely chronic  microvascular ischemic disease. MRI Results (most recent):    Results from [place] encounter on 05/14/17   MRA NECK WO CONT   Narrative History: Slurred speech, facial droop, right leg weakness, CVA    PROCEDURE: 2-D and 3-D time-of-flight noncontrast MRA neck performed with MIP  reconstructions obtained. FINDINGS: The visualized mid and distal cervical portions of vertebral arteries  and proximal visualized intradural vertebral arteries appear diffusely small but  patent. No evidence of significant distal common carotid artery stenosis, not  fully included proximally. No evidence of significant proximal right ICA  stenosis.  There is eccentric plaque proximal left ICA narrowing the lumen, with  approximate residual luminal diameter 1.4 mm compared to normal caliber cervical  ICA 3.5 mm representing 60% diameter stenosis by NASCET criteria. Impression IMPRESSION:    1. Somewhat limited evaluation due to noncontrast study. 2. Moderate appearing approximately 60% diameter stenosis proximal left ICA. Correlation to noninvasive carotid Doppler study recommended. 3. No other significant carotid or vertebral stenosis. Latest Hemoglobin A1C:  Lab Results   Component Value Date/Time    Hemoglobin A1c 5.9 05/15/2017 08:35 AM       Latest Cardiology Procedure:  Results for orders placed or performed during the hospital encounter of 05/14/17   EKG, 12 LEAD, INITIAL   Result Value Ref Range    Ventricular Rate 79 BPM    Atrial Rate 79 BPM    P-R Interval 162 ms    QRS Duration 90 ms    Q-T Interval 420 ms    QTC Calculation (Bezet) 481 ms    Calculated P Axis 62 degrees    Calculated R Axis 6 degrees    Calculated T Axis 25 degrees    Diagnosis       Normal sinus rhythm  Normal ECG  When compared with ECG of 15-JULIANE-2016 10:56,  Questionable change in QRS axis  Confirmed by Matthew Colunga ([id number]) on 5/16/2017 7:52:21 AM         Important Labs:  No results found for: FOL, RBCF  Lab Results   Component Value Date/Time    Cholesterol, total 98 05/15/2017 08:35 AM    HDL Cholesterol 60 05/15/2017 08:35 AM    LDL, calculated 26.4 05/15/2017 08:35 AM    VLDL, calculated 11.6 05/15/2017 08:35 AM    Triglyceride 58 05/15/2017 08:35 AM    CHOL/HDL Ratio 1.6 05/15/2017 08:35 AM     Lab Results   Component Value Date/Time    TSH 0.85 05/15/2017 08:35 AM    Triiodothyronine (T3), free 2.1 05/15/2017 08:35 AM    T4, Free 0.9 05/15/2017 08:35 AM     No results found for: DS35, PHEN, PHENO, PHENT, DILF, DS39, PHENY, PTN, VALF2, VALAC, VALP, VALPR, DS6, CRBAM, CRBAMP, CARB2, XCRBAM    Discussed with: patient     Allergies: No Known Allergies  Neurological examination:   · Appearance:  In no acute distress, well developed, well nourished, cooperative  · Cardiovascular: Heart is regular rate and rhythm, peripheral edema is absent. No carotid bruits heard. AV fistula RUE   · Mental status examination: Alert and oriented X 3. No dysarthria and mild dysphasia. Normal attention, memory and fund of knowledge. · Cranial Nerves:      I: smell Not tested   II: visual fields Full to confrontation   II: pupils Equal, round, reactive to light   III,VII: ptosis none   III,IV,VI: extraocular muscles  Full ROM   V: facial light touch sensation  normal   V,VII: corneal reflex      VII: facial muscle function  normal   VIII: hearing     IX: soft palate elevation  normal   IX,X: gag reflex    XI: sternocleidomastoid strength 5/5   XII: tongue strength  normal         · Motor exam: Station, gait: deferred. No focal weakness in the limbs. No trouble lift up arms and briefly raise up her legs. · Sensory: Intact to primary modalities to face and bilateral arms. Decreased sharp/dull to BLE: RLE from calves down and LLE from ankles down. Pressure sensation intact. · Coordination: Normal rapid alternating movements, finger to nose testing. · Reflexes: Symmetric and 1+ in bilateral biceps, triceps, brachioradialis, patellar, ankle reflexes.  Toes are down going.                  Vital Signs:   Visit Vitals    /77 (BP 1 Location: Left leg, BP Patient Position: Head of bed elevated (Comment degrees))    Pulse 84    Temp 98.7 °F (37.1 °C)    Resp 19    Wt 59.9 kg (132 lb 1.6 oz)    SpO2 95%    BMI 20.69 kg/m2            Medications:    [x] REVIEWED  Current Facility-Administered Medications   Medication    lidocaine (LIDODERM) 5 % patch 2 Patch    B complex-vitamin C-folic acid (NEPHRO-KATHYA) 0.8 mg tab    ondansetron (ZOFRAN) injection 1 mg    aspirin (ASPIRIN) tablet 325 mg    atorvastatin (LIPITOR) tablet 80 mg    acetaminophen (TYLENOL) tablet 650 mg    acetaminophen (TYLENOL) suppository 650 mg    senna-docusate (PERICOLACE) 8.6-50 mg per tablet 2 Tab    pantoprazole (PROTONIX) 40 mg in sodium chloride 0.9 % 10 mL injection    albuterol (PROVENTIL VENTOLIN) nebulizer solution 2.5 mg    folic acid (FOLVITE) 1 mg, thiamine (B-1) 100 mg in 0.9% sodium chloride 50 mL ivpb    brinzolamide (AZOPT) 1 % ophthalmic suspension 1 Drop    brimonidine (ALPHAGAN) 0.2 % ophthalmic solution 1 Drop    sevelamer carbonate (RENVELA) tab 800 mg    0.9% sodium chloride infusion    albumin human 25% (BUMINATE) solution 12.5 g    timolol (TIMOPTIC) 0.5 % ophthalmic solution 1 Drop    enoxaparin (LOVENOX) injection 30 mg    gabapentin (NEURONTIN) capsule 400 mg     Data:      [] REVIEWED  Recent Results (from the past 24 hour(s))   GLUCOSE, POC    Collection Time: 05/18/17 10:17 PM   Result Value Ref Range    Glucose (POC) 112 (H) 70 - 110 mg/dL   CBC WITH AUTOMATED DIFF    Collection Time: 05/19/17  4:09 AM   Result Value Ref Range    WBC 5.0 4.6 - 13.2 K/uL    RBC 4.49 4.20 - 5.30 M/uL    HGB 11.9 (L) 12.0 - 16.0 g/dL    HCT 37.1 35.0 - 45.0 %    MCV 82.6 74.0 - 97.0 FL    MCH 26.5 24.0 - 34.0 PG    MCHC 32.1 31.0 - 37.0 g/dL    RDW 17.6 (H) 11.6 - 14.5 %    PLATELET 601 (L) 947 - 420 K/uL    MPV 10.6 9.2 - 11.8 FL    NEUTROPHILS 52 40 - 73 %    LYMPHOCYTES 28 21 - 52 %    MONOCYTES 13 (H) 3 - 10 %    EOSINOPHILS 7 (H) 0 - 5 %    BASOPHILS 0 0 - 2 %    ABS. NEUTROPHILS 2.5 1.8 - 8.0 K/UL    ABS. LYMPHOCYTES 1.4 0.9 - 3.6 K/UL    ABS. MONOCYTES 0.7 0.05 - 1.2 K/UL    ABS. EOSINOPHILS 0.3 0.0 - 0.4 K/UL    ABS.  BASOPHILS 0.0 0.0 - 0.06 K/UL    DF AUTOMATED     METABOLIC PANEL, COMPREHENSIVE    Collection Time: 05/19/17  4:09 AM   Result Value Ref Range    Sodium 133 (L) 136 - 145 mmol/L    Potassium 6.2 (HH) 3.5 - 5.5 mmol/L    Chloride 97 (L) 100 - 108 mmol/L    CO2 26 21 - 32 mmol/L    Anion gap 10 3.0 - 18 mmol/L    Glucose 61 (L) 74 - 99 mg/dL    BUN 32 (H) 7.0 - 18 MG/DL    Creatinine 5.13 (H) 0.6 - 1.3 MG/DL BUN/Creatinine ratio 6 (L) 12 - 20      GFR est AA 10 (L) >60 ml/min/1.73m2    GFR est non-AA 8 (L) >60 ml/min/1.73m2    Calcium 8.8 8.5 - 10.1 MG/DL    Bilirubin, total 0.6 0.2 - 1.0 MG/DL    ALT (SGPT) 17 13 - 56 U/L    AST (SGOT) 27 15 - 37 U/L    Alk.  phosphatase 108 45 - 117 U/L    Protein, total 5.7 (L) 6.4 - 8.2 g/dL    Albumin 2.7 (L) 3.4 - 5.0 g/dL    Globulin 3.0 2.0 - 4.0 g/dL    A-G Ratio 0.9 0.8 - 1.7     POTASSIUM    Collection Time: 05/19/17 11:13 AM   Result Value Ref Range    Potassium 3.6 3.5 - 5.5 mmol/L   GLUCOSE, POC    Collection Time: 05/19/17 11:53 AM   Result Value Ref Range    Glucose (POC) 170 (H) 70 - 110 mg/dL       Duane Hobbs NP

## 2017-05-19 NOTE — PROGRESS NOTES
Received call from Melissa Evangelista,  pt ready for transfer to SNF. Spoke with [facility], states they can accept pt tomorrow. Spoke with pt's dtr, Virginia Rodríguez & made her aware, she is agreeable to transfer. Jame Britton RN states she will get  to set up transport for will hugo for tomorrow & one week of dialysis. Dr. Lise Klinefelter made aware of bed tomorrow. Orly Mejía RN,ext. [phone].

## 2017-05-19 NOTE — ROUTINE PROCESS
Assumed pt care. Received pt resting. A/O to person and place. No s/s of distress. Denies pain and discomfort at the moment. Dual NIH done with Kezia Brar RN. Bed on lowest position and wheels lock. Call bell within reach. 5/19/2017  0500 transported pt to dialysis via bed accompanied by RN    [id number] Critical Lab called by Jacklyn aTn: Potassium level of 6.2    0645 Dr. JOHANNA Davila notified and repeat potassium ordered. 0273 Bedside and Verbal shift change report given to Kezia Brar RN (oncoming nurse) by Meagan Hansen RN   and Sister Hue Garcia RN (preceptor) (offgoing nurse). Report included the following information SBAR, Kardex, Intake/Output and MAR.

## 2017-05-19 NOTE — PROGRESS NOTES
Problem: Mobility Impaired (Adult and Pediatric)  Goal: *Acute Goals and Plan of Care (Insert Text)  PHYSICAL THERAPY SHORT TERM GOALS :   1. Patient will perform/completeroll to both sides with supervision/set-up within 1 week(s). 2. Patient will perform/completesupine to sitting with minimal assistance/contact guard assist within 1 week(s). 3. Patient will perform/completebed to chair with minimal assistance/contact guard assist within 1 week(s). 4. Patient will perform/complete propel wheelchair for 50 feet to egress bus for dialysis with minimal assistance/contact guard assist within 1 week(s). 5. Patient will participate in lower extremity therapeutic exercise/activities with minimal assistance/contact guard assist for 8 minutes within 1 week(s). Therapist Jen Herron, PT 5/16/2017  Time Calculation: 26 mins   Outcome: Progressing Towards Goal  PHYSICAL THERAPY TREATMENT     Patient: Jatinder Estrada (57 y.o. female)  Date: 5/19/2017  Diagnosis: Encephalopathy Acute ischemic stroke [facility])  Precautions: Fall   Chart, physical therapy assessment, plan of care and goals were reviewed. ASSESSMENT:  Pt required a lot of encouragement to participate. Co-treated with OT for maximal pt participation and safety. Increased time to carryout bed mobility. Good static and dynamic sitting balance while performing ADLs with OT. Elevated bed to assist with sit to stand, maintained standing for 15-20 seconds with RW and assist x2. Pt able to assist with scooting up to [facility] while sitting EOB. Pt left sitting EOB. Education:UE placement to assist with bed mobility  Progression toward goals:  [ ]      Improving appropriately and progressing toward goals  [X]      Improving slowly and progressing toward goals  [ ]      Not making progress toward goals and plan of care will be adjusted       PLAN:  Patient continues to benefit from skilled intervention to address the above impairments.   Continue treatment per established plan of care. Discharge Recommendations:  Skilled Nursing Facility  Further Equipment Recommendations for Discharge:  rolling walker       SUBJECTIVE:   Patient stated I can't.       OBJECTIVE DATA SUMMARY:   Critical Behavior:  Neurologic State: Alert  Orientation Level: Disoriented to person, Disoriented to situation  Cognition: Follows commands  Safety/Judgement: Fall prevention  Functional Mobility Training:  Bed Mobility:  Supine to Sit: Additional time;Minimum assistance  Scooting: Maximum assistance;Assist x2  Transfers:  Sit to Stand: Maximum assistance;Assist x2  Stand to Sit: Contact guard assistance;Assist x2  Balance:  Sitting: Intact  Sitting - Static: Good (unsupported)  Sitting - Dynamic: Good (unsupported)  Standing: Impaired; With support  Standing - Static: Fair (fair/fair-)  Pain:  Pre unable to rate  Post unable to rate  Pain Scale 1: Numeric (0 - 10)  Location: (B) knees  Activity Tolerance:   Poor motivation  Please refer to the flowsheet for vital signs taken during this treatment.   After treatment:   [ ] Patient left in no apparent distress sitting up in chair  [X] Patient left in no apparent distress in bed  [X] Call bell left within reach  [ ] Nursing notified  [ ] Caregiver present  [ ] Bed alarm activated      [address], PTA   Time Calculation: 30 mins

## 2017-05-19 NOTE — PROGRESS NOTES
Assumed care of patient. Patient is off floor at dialysis. [address] Per Dr. Ishan Ruby verbal order received for Lidoderm patches to both knees.

## 2017-05-19 NOTE — DIALYSIS
ACUTE HEMODIALYSIS FLOW SHEET    HEMODIALYSIS ORDERS: Physician:Dr. Trang Del Castillo      Dialyzer: Revaclear        Duration: 3.0 hr  BFR: 400   DFR: 800   Dialysate:  Temp 37.0  K+ 2.0   Ca+ 2.5  Na 140  Bicarb 35   Weight:  59.9 kg    Bed Scale [x]     Unable to Obtain []        Dry weight/UF Goal: 1500 mL   Access RUE AVF  Needle Gauge 15 gauge 1 inch    Heparin []  Bolus      Units    [] Hourly       Units    [x]None     Catheter locking solution  n/a   Pre BP: 131/37    Pulse: 86   Temperature: 98  Respirations: 18 Tx: NS       ml/Bolus  Other        [x] N/A   Labs: Pre        Post:        [x] N/A   Additional Orders(medications, blood products, hypotension management):       [x] N/A     [x] Time Out/Safety Check  [x] [facility] Consent Verified     CATHETER ACCESS: [x]N/A   []Right   []Left   []IJ     []Fem   [] First use X-ray verified     []Tunnel                [] Non Tunneled   []No S/S infection  []Redness  []Drainage []Cultured []Swelling []Pain   []Medical Aseptic Prep Utilized   []Dressing Changed  [] Biopatch  Date:       []Clotted   []Patent   Flows: []Good  []Poor  []Reversed   If access problem,  notified: []Yes    [x]N/A  Date:           GRAFT/FISTULA ACCESS:  []N/A     [x]Right     []Left     [x]UE     []LE   [x]AVG   []AVF        []Buttonhole    [x]Medical Aseptic Prep Utilized   [x]No S/S infection  []Redness  []Drainage []Cultured []Swelling []Pain    Bruit:   [x] Strong    [] Weak       Thrill :   [x] Strong    [] Weak       Needle Gauge: 15   Length: 1   If access problem,  notified: []Yes     [x]N/A  Date:        Please describe access if present and not used:       GENERAL ASSESSMENT:    LUNGS:  Rate 18 SaO2%        [] N/A    [x] Clear  [] Coarse  [] Crackles  [] Wheezing        [] Diminished     Location : []RLL   []LLL    []RUL  []MAGDIEL   Cough: []Productive  [x]Dry  []N/A   Respirations:  [x]Easy  []Labored   Therapy:  [x]RA  []NC  l/min    Mask: []NRB []Venti       O2% []Ventilator  []Intubated  [] Trach  [] BiPaP   CARDIAC: [x]Regular      [] Irregular   [] Pericardial Rub  [] JVD        []  Monitored  [] Bedside  [] Remotely monitored [] N/A  Rhythm:    EDEMA: [x] None  []Generalized  [] Pitting [] 1    [] 2    [] 3    [] 4                 [] Facial  [] Pedal  []  UE  [] LE   SKIN:   [x] Warm  [] Hot     [] Cold   [x] Dry     [] Pale   [] Diaphoretic                  [] Flushed  [] Jaundiced  [] Cyanotic  [] Rash  [] Weeping   LOC:    [x] Alert      [x]Oriented:    [x] Person     [x] Place  [x]Time               [] Confused  [] Lethargic  [] Medicated  [] Non-responsive     GI / ABDOMEN:  [] Flat    [] Distended    [x] Soft    [] Firm   []  Obese                             [] Diarrhea  [x] Bowel Sounds  [] Nausea  [] Vomiting       / URINE ASSESSMENT:[] Voiding   [] Oliguria  [x] Anuria   []  Galindo     [] Incontinent    []  Incontinent Brief      []  Bathroom Privileges     PAIN: [x] 0 []1  []2   []3   []4   []5   []6   []7   []8   []9   []10            Scale 0-10  Action/Follow Up:    MOBILITY:  [] Amb    [] Amb/Assist    [x] Bed    [] Wheelchair  [] Stretcher      All Vitals and Treatment Details on Attached [address]: [facility]         Room # 2117     [] 1st Time Acute  [] Stat  [x] Routine  [] Urgent     [x] Acute Room  []  Bedside  [] ICU/CCU  [] ER   Isolation Precautions:  [x] Dialysis   [] Airborne   [] Contact    [] Reverse   Special Considerations:         [] Blood Consent Verified [x]N/A     ALLERGIES:   [x] NKA          Code Status:  [x] Full Code  [] DNR  [] Other           HBsAg ONLY: Date Drawn 04/19/17      [x]Negative []Positive []Unknown   HBsAb: Date 01/18/17   [] Susceptible   [x] [id number] []Not Drawn  [] Drawn     Current Labs:    Date of Labs: Today [x]     Results for Nicole Coppola (MRN [id number]) as of 5/19/2017 06:42   Ref.  Range 5/19/2017 04:09   WBC Latest Ref Range: 4.6 - 13.2 K/uL 5.0   RBC Latest Ref Range: 4.20 - 5.30 M/uL 4.49   HGB Latest Ref Range: 12.0 - 16.0 g/dL 11.9 (L)   HCT Latest Ref Range: 35.0 - 45.0 % 37.1   MCV Latest Ref Range: 74.0 - 97.0 FL 82.6   MCH Latest Ref Range: 24.0 - 34.0 PG 26.5   MCHC Latest Ref Range: 31.0 - 37.0 g/dL 32.1   RDW Latest Ref Range: 11.6 - 14.5 % 17.6 (H)   PLATELET Latest Ref Range: 135 - 420 K/uL 115 (L)   MPV Latest Ref Range: 9.2 - 11.8 FL 10.6   NEUTROPHILS Latest Ref Range: 40 - 73 % 52   LYMPHOCYTES Latest Ref Range: 21 - 52 % 28   MONOCYTES Latest Ref Range: 3 - 10 % 13 (H)   EOSINOPHILS Latest Ref Range: 0 - 5 % 7 (H)   BASOPHILS Latest Ref Range: 0 - 2 % 0   DF Latest Units:   AUTOMATED   ABS. NEUTROPHILS Latest Ref Range: 1.8 - 8.0 K/UL 2.5   ABS. LYMPHOCYTES Latest Ref Range: 0.9 - 3.6 K/UL 1.4   ABS. MONOCYTES Latest Ref Range: 0.05 - 1.2 K/UL 0.7   ABS. EOSINOPHILS Latest Ref Range: 0.0 - 0.4 K/UL 0.3   ABS. BASOPHILS Latest Ref Range: 0.0 - 0.06 K/UL 0.0   Sodium Latest Ref Range: 136 - 145 mmol/L 133 (L)   Potassium Latest Ref Range: 3.5 - 5.5 mmol/L 6.2 (HH)   Chloride Latest Ref Range: 100 - 108 mmol/L 97 (L)   CO2 Latest Ref Range: 21 - 32 mmol/L 26   Anion gap Latest Ref Range: 3.0 - 18 mmol/L 10   Glucose Latest Ref Range: 74 - 99 mg/dL 61 (L)   BUN Latest Ref Range: 7.0 - 18 MG/DL 32 (H)   Creatinine Latest Ref Range: 0.6 - 1.3 MG/DL 5.13 (H)   BUN/Creatinine ratio Latest Ref Range: 12 - 20   6 (L)   Calcium Latest Ref Range: 8.5 - 10.1 MG/DL 8.8   GFR est non-AA Latest Ref Range: >60 ml/min/1.73m2 8 (L)   GFR est AA Latest Ref Range: >60 ml/min/1.73m2 10 (L)   Bilirubin, total Latest Ref Range: 0.2 - 1.0 MG/DL 0.6   Protein, total Latest Ref Range: 6.4 - 8.2 g/dL 5.7 (L)   Albumin Latest Ref Range: 3.4 - 5.0 g/dL 2.7 (L)   Globulin Latest Ref Range: 2.0 - 4.0 g/dL 3.0   A-G Ratio Latest Ref Range: 0.8 - 1.7   0.9   ALT Latest Ref Range: 13 - 56 U/L 17   AST Latest Ref Range: 15 - 37 U/L 27   Alk.  phosphatase Latest Ref Range: 45 - 117 U/L 108 DIET:  [x] Renal    [] Other     [] NPO     []  Diabetic      PRIMARY NURSE REPORT: First initial/Last name/Title      Pre Dialysis: SHAHID Quintana RN    Time: 0500     EDUCATION:    [x] Patient [] Other         Knowledge Basis: []None [x]Minimal [] Substantial   Barriers to learning  []N/A   [] Access Care     [] S&S of infection     [] Fluid Management     []K+     [x]Procedural    []Albumin     [] Medications     [] Tx Options     [] Transplant     [] Diet     [] Other   Teaching Tools:  [x] Explain  [] Demo  [] Handouts [] Video  Patient response:   [x] Verbalized understanding  [] Teach back  [] Return demonstration [x] Requires follow up   Inappropriate due to            [address] Before each treatment:     Machine Number:                   [address] [facility]                                   [] Unit Machine #  with centralized RO                                  [] Portable Machine #1/RO serial # [id number]                                  [] Portable Machine #2/RO serial # [id number]                                  [] Portable Machine #3/RO serial # [id number]                                                                                                       [address] [facility]                                  [] Portable Machine #11/RO serial # [id number]                                   [x] Portable Machine #12/RO serial # [id number]                                  [] Portable Machine #13/RO serial #  [id number]      Alarm Test:  Pass time 0137         Other:         [x] RO/Machine Log Complete      Temp 37.0          [x]Extracorporeal Circuit Tested for integrity   Dialysate: pH 7.0  Conductivity: Meter  14   HD Machine  14.1       TCD: 14  Dialyzer Lot # [id number] 03/08/19           Blood Tubing Lot # [id number] 09/30/21           Saline Lot #  [id number] 01/01/19 CHLORINE TESTING-Before each treatment and every 4 hours    Total Chlorine: [x] less than 0.1 ppm  Time: 0445 4 Hr/2nd Check Time: 0800   (if greater than 0.1 ppm from Primary then every 30 minutes from Secondary)     TREATMENT INITIATION  with Dialysis Precautions:   [x] All Connections Secured                 [x] Saline Line Double Clamped   [x] Venous Parameters Set                  [x] Arterial Parameters Set    [x] Prime Given  250 ml               [x]Air Foam Detector Engaged      Treatment Initiation Note:  1942- Pt awake, alert, verbally responsive, vital signs stable, pt denies pain or discomfort, RUE AVG assessed and accessed for cannulation, dialysis started   0600- Pt has no changes at this time  0630- Vital signs stable   0650- AM lab results posted K+6.2, K+ bath changed to 1 K+, pt has 2 hrs remaining in tx   0700- Pt resting with eyes closed   0730- Pt has no changes at this time  0800- Dr Kim President called and given update on pt, agrees with current changes in treatment at this time, no further orders received   0830- Pt has no changes noted at this time       Medication Dose Volume Route Initials Dialyzer Cleared: [x] Good [] Fair  [] Poor    Blood processed:  67.2 L  UF Removed  1500 mL    Post Wt:     kg  POst BP:  153/58    Pulse: 74    Respirations: 18  Temperature: 97.3      NaCl 0.9%       2      250 mL Prime, 250 mL Rinse         IV     DEVYN     Post Tx Vascular Access: AVF/AVG: Bleeding stopped Art 5 min.  Carlton. 5 Min                                     Catheter: Locking solution: Heparin                                  Post Assessment:                                    Skin:  [x] Warm  [x] Dry [] Diaphoretic    [] Flushed  [] Pale [] Cyanotic   [facility] Signatures Title Initials  Time Lungs: [x] Clear    [] Course  [] Crackles  [] Wheezing [] Diminished   Atul Almonte RN  [facility]   Cardiac: [] Regular   [] Irregular   [] Monitor  [] N/A  Rhythm:           Edema:  [x] None    [] General     [] Facial   [] Pedal    [] UE    [] LE       Pain: [x]0  []1  []2   []3  []4   []5   []6   []7   []8   []9   []10         Post Treatment Note: 1707- Pt completed dialysis, removed 1.5 Liters uf without complications, pt returned to room     POST TREATMENT PRIMARY NURSE HANDOFF REPORT:     First initial/Last name/Title         Post Dialysis: Daryn De Anda RN  Time:  2057     Abbreviations: AVG-arterial venous graft, AVF-arterial venous fistula, IJ-Internal Jugular, Subcl-Subclavian, Fem-Femoral, Tx-treatment, AP/HR-apical heart rate, DFR-dialysate flow rate, BFR-blood flow rate, AP-arterial pressure, -venous pressure, UF-ultrafiltrate, TMP-transmembrane pressure, Carlton-Venous, Art-Arterial, RO-Reverse Osmosis

## 2017-05-19 NOTE — ANCILLARY DISCHARGE INSTRUCTIONS
Patient placed on will call for tomorrow with [facility], spoke with Shellia Duane. Dialysis transport set up with [facility] with Pippa Gonzalez for Mon 5/22/17, Wed 5/24/17,  Friday 5/26/17 only of next week.

## 2017-05-19 NOTE — ANCILLARY DISCHARGE INSTRUCTIONS
Patient and/or next of kin has been given the [facility] Important Message From Medicare About Your Rights\" letter and all questions were answered.

## 2017-05-19 NOTE — PROGRESS NOTES
Problem: Self Care Deficits Care Plan (Adult)  Goal: *Acute Goals and Plan of Care (Insert Text)  Occupational Therapy Goals  Initiated 5/15/2017 within 7 day(s). 1. Patient will perform grooming tasks at EOB with supervision for balance. 2. Patient will perform upper body dressing with supervision and minimal verbal cues to maintain midline in sitting. 3. Patient will perform functional task in standing for 3 minutes with minimal assistance to increase activity tolerance for ADLs. 4. Patient will perform toilet transfers with moderate assistance . 5. Patient will perform all aspects of toileting with moderate assistance . 6. Patient will participate in upper extremity therapeutic exercise/activities with minimal assistance for 8 minutes to increase/maintain strength/AROM of BUEs for ADLs. 7. Patient will utilize energy conservation techniques during functional activities with minimal verbal cues. Outcome: Progressing Towards Goal  OCCUPATIONAL THERAPY TREATMENT     Patient: Sherly Ugalde (20 y.o. female)  Date: 5/19/2017  Diagnosis: Encephalopathy Acute ischemic stroke [facility])       Precautions: Fall  Chart, occupational therapy assessment, plan of care, and goals were reviewed. ASSESSMENT:  Pt co-treated w/PT to maximize safety w/standing activity for ADL carryover w/toileting/LB ADLs. Pt demonstrates energy conservation techniques w/UB ADL seated at EOB w/set-up. Pt requires 2 person assist and encouragement for functional  transfer to standing for tucker-care. Pt decrease standing balance requires Max Assist w/clothing mgt and tucker-care.    EDUCATION Pt educated on energy conservation techniques w/ADLs  Progression toward goals:  [ ]          Improving appropriately and progressing toward goals  [X]          Improving slowly and progressing toward goals  [ ]          Not making progress toward goals and plan of care will be adjusted       PLAN:  Patient continues to benefit from skilled intervention to address the above impairments. Continue treatment per established plan of care. Discharge Recommendations:  Nasim Luz  Further Equipment Recommendations for Discharge:  bedside commode, shower chair and rolling walker       SUBJECTIVE:   Patient stated Milo Neves was a  at [employer].       OBJECTIVE DATA SUMMARY:         Cognitive/Behavioral Status:  Neurologic State: Alert  Orientation Level: Oriented to person, Oriented to place  Cognition: Appropriate for age attention/concentration, Follows commands  Safety/Judgement: Fall prevention  Functional Mobility and Transfers for ADLs:              Bed Mobility:  Supine to Sit: Additional time;Minimum assistance  Scooting: Maximum assistance;Assist x2              Transfers:  Sit to Stand: Maximum assistance;Assist x2  Balance:  Sitting: Intact  Sitting - Static: Good (unsupported)  Sitting - Dynamic: Good (unsupported)  Standing: Impaired; With support  Standing - Static: Fair (fair/fair-)  ADL Intervention:  Grooming  Washing Face: Supervision/set-up  Washing Hands: Supervision/set-up     Upper Body Bathing  Bathing Assistance: Stand-by assistance  Position Performed: Seated edge of bed     Lower Body Bathing  Perineal  : Maximum assistance  Position Performed: Standing     Lower Body Dressing Assistance  Socks: Maximum assistance  Leg Crossed Method Used: No  Position Performed: Long sitting on bed;Bending forward method  Cues: Don     Pain:  Pre Treatment:0  Post Treatment:0  Pt c/o (B) knee pain w/flexion and weight bearing     Activity Tolerance:    Fair     Please refer to the flowsheet for vital signs taken during this treatment.   After treatment:   [ ]  Patient left in no apparent distress sitting up in chair  [X]  Patient left in no apparent distress seated EOB  [X]  Call bell left within reach  [ ]  Nursing notified  [ ]  Caregiver present  [ ]  Bed alarm activated     RE [place]  Time Calculation: 30 mins

## 2017-05-19 NOTE — CONSULTS
Neurology progress Note          Admit Date: 5/14/2017  Length of Stay: 4  Primary Care: Zi Casas MD   Principle Problem: Acute ischemic stroke [facility])     Assessment/ plan   1. Acute Stroke: in the left insular cortex with mild dysphasia   Keep  ASA daily for antiplatelet therapy. Atorvastatin 80 mg qd. Lovenox for VTE. Manage BP per stroke protocol. Avoid lowering BP during dialysis. Cardiac monitoring . Speech therapy. Placement is pending. 2. ESRD: on HD per renal.           History: Sammie Vanegas is a 75 yo AA female with PMH of DM, HTN, stroke, ESRD, and arthritis who presented to the ED with difficulty expressing herself. She has some trouble bringing  out her words at times. No focal weakness. MRA showed Moderate appearing approximately 60% diameter stenosis proximal left ICA. Brain MRA was unremarkable. MRI Results (most recent):    Results from [place] encounter on 05/14/17   MRA NECK WO CONT   Narrative History: Slurred speech, facial droop, right leg weakness, CVA    PROCEDURE: 2-D and 3-D time-of-flight noncontrast MRA neck performed with MIP  reconstructions obtained. FINDINGS: The visualized mid and distal cervical portions of vertebral arteries  and proximal visualized intradural vertebral arteries appear diffusely small but  patent. No evidence of significant distal common carotid artery stenosis, not  fully included proximally. No evidence of significant proximal right ICA  stenosis. There is eccentric plaque proximal left ICA narrowing the lumen, with  approximate residual luminal diameter 1.4 mm compared to normal caliber cervical  ICA 3.5 mm representing 60% diameter stenosis by NASCET criteria. Impression IMPRESSION:    1. Somewhat limited evaluation due to noncontrast study. 2. Moderate appearing approximately 60% diameter stenosis proximal left ICA. Correlation to noninvasive carotid Doppler study recommended.     3. No other significant carotid or vertebral stenosis. Latest Hemoglobin A1C:  Lab Results   Component Value Date/Time    Hemoglobin A1c 5.9 05/15/2017 08:35 AM       Latest Cardiology Procedure:  Results for orders placed or performed during the hospital encounter of 05/14/17   EKG, 12 LEAD, INITIAL   Result Value Ref Range    Ventricular Rate 79 BPM    Atrial Rate 79 BPM    P-R Interval 162 ms    QRS Duration 90 ms    Q-T Interval 420 ms    QTC Calculation (Bezet) 481 ms    Calculated P Axis 62 degrees    Calculated R Axis 6 degrees    Calculated T Axis 25 degrees    Diagnosis       Normal sinus rhythm  Normal ECG  When compared with ECG of 15-JULIANE-2016 10:56,  Questionable change in QRS axis  Confirmed by Samm Alfaro ([id number]) on 5/16/2017 7:52:21 AM         Important Labs:  No results found for: FOL, RBCF  Lab Results   Component Value Date/Time    Cholesterol, total 98 05/15/2017 08:35 AM    HDL Cholesterol 60 05/15/2017 08:35 AM    LDL, calculated 26.4 05/15/2017 08:35 AM    VLDL, calculated 11.6 05/15/2017 08:35 AM    Triglyceride 58 05/15/2017 08:35 AM    CHOL/HDL Ratio 1.6 05/15/2017 08:35 AM     Lab Results   Component Value Date/Time    TSH 0.85 05/15/2017 08:35 AM    Triiodothyronine (T3), free 2.1 05/15/2017 08:35 AM    T4, Free 0.9 05/15/2017 08:35 AM           PMH:   Past Medical History:   Diagnosis Date    Anemia NEC     CAD (coronary artery disease)     Congestive heart failure, unspecified     Diabetes ([facility] [address]     Dyslipidemia     ESRD (end stage renal disease) on dialysis ([facility] [address]     mwf, fresenius in [facility], Dr. Josue Julio.      GERD (gastroesophageal reflux disease)     Glaucoma     HTN (hypertension)     Kidney failure     LVH (left ventricular hypertrophy)     Neuropathy             FH:   Family History   Problem Relation Age of Onset    Hypertension Other     Heart Disease Other     Diabetes Other     Diabetes Other     Heart Disease Other     Hypertension Other         SH: Social History     Social History    Marital status:      Spouse name: N/A    Number of children: N/A    Years of education: N/A     Social History Main Topics    Smoking status: Never Smoker    Smokeless tobacco: Never Used    Alcohol use No    Drug use: No    Sexual activity: Not Asked     Other Topics Concern    None     Social History Narrative          Vital Signs:   Visit Vitals    /67 (BP 1 Location: Left leg, BP Patient Position: Head of bed elevated (Comment degrees))    Pulse 66    Temp 98.2 °F (36.8 °C)    Resp 18    Wt 59.9 kg (132 lb 1.6 oz)    SpO2 100%    BMI 20.69 kg/m2      Neurological examination:    Appearance: In no acute distress, well developed, cooperative   Cardiovascular:  AV fistula RUE    Mental status examination: Alert and oriented. No dysarthria and mild dysphasia. .   Cranial Nerves:     I: smell    II: visual fields    II: pupils Equal, round, reactive to light   III,VII: ptosis none   III,IV,VI: extraocular muscles  Full ROM   V: facial light touch sensation  normal   V,VII: corneal reflex     VII: facial muscle function  normal   VIII: hearing    IX: soft palate elevation     IX,X: gag reflex    XI: sternocleidomastoid strength    XII: tongue strength  normal        Motor exam: Station, gait:  deferred. No trouble lift up arms and able to briefly raise up her legs.  Sensory: Intact to LT bilaterally .        Coordination:  finger to nose testing is normal          Medications:      [x] REVIEWED  Current Facility-Administered Medications   Medication    lidocaine (LIDODERM) 5 % patch 2 Patch    B complex-vitamin C-folic acid (NEPHRO-KATHYA) 0.8 mg tab    ondansetron (ZOFRAN) injection 1 mg    aspirin (ASPIRIN) tablet 325 mg    atorvastatin (LIPITOR) tablet 80 mg    acetaminophen (TYLENOL) tablet 650 mg    acetaminophen (TYLENOL) suppository 650 mg    senna-docusate (PERICOLACE) 8.6-50 mg per tablet 2 Tab    pantoprazole (PROTONIX) 40 mg in sodium chloride 0.9 % 10 mL injection    albuterol (PROVENTIL VENTOLIN) nebulizer solution 2.5 mg    folic acid (FOLVITE) 1 mg, thiamine (B-1) 100 mg in 0.9% sodium chloride 50 mL ivpb    brinzolamide (AZOPT) 1 % ophthalmic suspension 1 Drop    brimonidine (ALPHAGAN) 0.2 % ophthalmic solution 1 Drop    sevelamer carbonate (RENVELA) tab 800 mg    0.9% sodium chloride infusion    albumin human 25% (BUMINATE) solution 12.5 g    timolol (TIMOPTIC) 0.5 % ophthalmic solution 1 Drop    enoxaparin (LOVENOX) injection 30 mg    gabapentin (NEURONTIN) capsule 400 mg     Data:      [x] REVIEWED  Recent Results (from the past 24 hour(s))   GLUCOSE, POC    Collection Time: 05/18/17 10:17 PM   Result Value Ref Range    Glucose (POC) 112 (H) 70 - 110 mg/dL   CBC WITH AUTOMATED DIFF    Collection Time: 05/19/17  4:09 AM   Result Value Ref Range    WBC 5.0 4.6 - 13.2 K/uL    RBC 4.49 4.20 - 5.30 M/uL    HGB 11.9 (L) 12.0 - 16.0 g/dL    HCT 37.1 35.0 - 45.0 %    MCV 82.6 74.0 - 97.0 FL    MCH 26.5 24.0 - 34.0 PG    MCHC 32.1 31.0 - 37.0 g/dL    RDW 17.6 (H) 11.6 - 14.5 %    PLATELET 204 (L) 393 - 420 K/uL    MPV 10.6 9.2 - 11.8 FL    NEUTROPHILS 52 40 - 73 %    LYMPHOCYTES 28 21 - 52 %    MONOCYTES 13 (H) 3 - 10 %    EOSINOPHILS 7 (H) 0 - 5 %    BASOPHILS 0 0 - 2 %    ABS. NEUTROPHILS 2.5 1.8 - 8.0 K/UL    ABS. LYMPHOCYTES 1.4 0.9 - 3.6 K/UL    ABS. MONOCYTES 0.7 0.05 - 1.2 K/UL    ABS. EOSINOPHILS 0.3 0.0 - 0.4 K/UL    ABS.  BASOPHILS 0.0 0.0 - 0.06 K/UL    DF AUTOMATED     METABOLIC PANEL, COMPREHENSIVE    Collection Time: 05/19/17  4:09 AM   Result Value Ref Range    Sodium 133 (L) 136 - 145 mmol/L    Potassium 6.2 (HH) 3.5 - 5.5 mmol/L    Chloride 97 (L) 100 - 108 mmol/L    CO2 26 21 - 32 mmol/L    Anion gap 10 3.0 - 18 mmol/L    Glucose 61 (L) 74 - 99 mg/dL    BUN 32 (H) 7.0 - 18 MG/DL    Creatinine 5.13 (H) 0.6 - 1.3 MG/DL    BUN/Creatinine ratio 6 (L) 12 - 20      GFR est AA 10 (L) >60 ml/min/1.73m2    GFR est non-AA 8 (L) >60 ml/min/1.73m2    Calcium 8.8 8.5 - 10.1 MG/DL    Bilirubin, total 0.6 0.2 - 1.0 MG/DL    ALT (SGPT) 17 13 - 56 U/L    AST (SGOT) 27 15 - 37 U/L    Alk.  phosphatase 108 45 - 117 U/L    Protein, total 5.7 (L) 6.4 - 8.2 g/dL    Albumin 2.7 (L) 3.4 - 5.0 g/dL    Globulin 3.0 2.0 - 4.0 g/dL    A-G Ratio 0.9 0.8 - 1.7     POTASSIUM    Collection Time: 05/19/17 11:13 AM   Result Value Ref Range    Potassium 3.6 3.5 - 5.5 mmol/L   GLUCOSE, POC    Collection Time: 05/19/17 11:53 AM   Result Value Ref Range    Glucose (POC) 170 (H) 70 - 110 mg/dL   GLUCOSE, POC    Collection Time: 05/19/17  5:08 PM   Result Value Ref Range    Glucose (POC) 117 (H) 70 - 110 mg/dL     Problem List:   Principal Problem:    Acute ischemic stroke ([facility] [address] (5/16/2017)    Active Problems:    Encephalopathy (5/15/2017)  Cheryl Kaufman MD

## 2017-05-19 NOTE — PROGRESS NOTES
Progress Note      Patient: Tamra Kamara               Sex: female          DOA: 5/14/2017       YOB: 1945      Age:  74 y.o.        LOS:  LOS: 4 days               Subjective:   Pt  Was sitting in the side of the bed and she as close to her baseline in terms of her speech . she still maintains she has wordfinding  difficulty . the plan for the pt is to go to a rehab .she has difficulty  in walking . The pt was dialysed yesterday . Objective:      Visit Vitals    /77 (BP 1 Location: Left leg, BP Patient Position: Head of bed elevated (Comment degrees))    Pulse 84    Temp 98.7 °F (37.1 °C)    Resp 19    Wt 59.9 kg (132 lb 1.6 oz)    SpO2 95%    BMI 20.69 kg/m2       Physical Exam:  Pt is awake and is alert . She is improving daily   Heart reg rate and rhythm   Lungs fair breath sound heard   Abdomen soft aznd no masses felt   Neuro dysarthria is mild . Lab/Data Reviewed:  CMP:   Lab Results   Component Value Date/Time     (L) 05/19/2017 04:09 AM    K 3.6 05/19/2017 11:13 AM    CL 97 (L) 05/19/2017 04:09 AM    CO2 26 05/19/2017 04:09 AM    AGAP 10 05/19/2017 04:09 AM    GLU 61 (L) 05/19/2017 04:09 AM    BUN 32 (H) 05/19/2017 04:09 AM    CREA 5.13 (H) 05/19/2017 04:09 AM    GFRAA 10 (L) 05/19/2017 04:09 AM    GFRNA 8 (L) 05/19/2017 04:09 AM    CA 8.8 05/19/2017 04:09 AM    ALB 2.7 (L) 05/19/2017 04:09 AM    TP 5.7 (L) 05/19/2017 04:09 AM    GLOB 3.0 05/19/2017 04:09 AM    AGRAT 0.9 05/19/2017 04:09 AM    SGOT 27 05/19/2017 04:09 AM    ALT 17 05/19/2017 04:09 AM     CBC:   Lab Results   Component Value Date/Time    WBC 5.0 05/19/2017 04:09 AM    HGB 11.9 (L) 05/19/2017 04:09 AM    HCT 37.1 05/19/2017 04:09 AM     (L) 05/19/2017 04:09 AM           Assessment/Plan     Principal Problem:    Acute ischemic stroke ([facility] [address] (5/16/2017)cva in the left insular cortex with dysarthria .      Active Problems:    Encephalopathy (5/15/2017)  Difficulty in walking esrd      Plan: will dc the pt when she can go to a snf

## 2017-05-20 VITALS
DIASTOLIC BLOOD PRESSURE: 73 MMHG | HEART RATE: 68 BPM | SYSTOLIC BLOOD PRESSURE: 118 MMHG | RESPIRATION RATE: 18 BRPM | TEMPERATURE: 98.1 F | WEIGHT: 131.2 LBS | OXYGEN SATURATION: 98 % | BODY MASS INDEX: 20.55 KG/M2

## 2017-05-20 LAB
ALBUMIN SERPL BCP-MCNC: 2.6 G/DL (ref 3.4–5)
ALBUMIN/GLOB SERPL: 0.8 {RATIO} (ref 0.8–1.7)
ALP SERPL-CCNC: 123 U/L (ref 45–117)
ALT SERPL-CCNC: 20 U/L (ref 13–56)
ANION GAP BLD CALC-SCNC: 6 MMOL/L (ref 3–18)
AST SERPL W P-5'-P-CCNC: 28 U/L (ref 15–37)
BASOPHILS # BLD AUTO: 0 K/UL (ref 0–0.06)
BASOPHILS # BLD: 1 % (ref 0–2)
BILIRUB SERPL-MCNC: 0.5 MG/DL (ref 0.2–1)
BUN SERPL-MCNC: 29 MG/DL (ref 7–18)
BUN/CREAT SERPL: 7 (ref 12–20)
CALCIUM SERPL-MCNC: 8.6 MG/DL (ref 8.5–10.1)
CHLORIDE SERPL-SCNC: 98 MMOL/L (ref 100–108)
CO2 SERPL-SCNC: 31 MMOL/L (ref 21–32)
CREAT SERPL-MCNC: 4.02 MG/DL (ref 0.6–1.3)
DIFFERENTIAL METHOD BLD: ABNORMAL
EOSINOPHIL # BLD: 0.4 K/UL (ref 0–0.4)
EOSINOPHIL NFR BLD: 10 % (ref 0–5)
ERYTHROCYTE [DISTWIDTH] IN BLOOD BY AUTOMATED COUNT: 17.8 % (ref 11.6–14.5)
GLOBULIN SER CALC-MCNC: 3.2 G/DL (ref 2–4)
GLUCOSE BLD STRIP.AUTO-MCNC: 146 MG/DL (ref 70–110)
GLUCOSE BLD STRIP.AUTO-MCNC: 201 MG/DL (ref 70–110)
GLUCOSE BLD STRIP.AUTO-MCNC: 70 MG/DL (ref 70–110)
GLUCOSE SERPL-MCNC: 74 MG/DL (ref 74–99)
HCT VFR BLD AUTO: 35.7 % (ref 35–45)
HGB BLD-MCNC: 11.4 G/DL (ref 12–16)
LYMPHOCYTES # BLD AUTO: 30 % (ref 21–52)
LYMPHOCYTES # BLD: 1.3 K/UL (ref 0.9–3.6)
MCH RBC QN AUTO: 26.6 PG (ref 24–34)
MCHC RBC AUTO-ENTMCNC: 31.9 G/DL (ref 31–37)
MCV RBC AUTO: 83.2 FL (ref 74–97)
MONOCYTES # BLD: 0.6 K/UL (ref 0.05–1.2)
MONOCYTES NFR BLD AUTO: 15 % (ref 3–10)
NEUTS SEG # BLD: 1.9 K/UL (ref 1.8–8)
NEUTS SEG NFR BLD AUTO: 44 % (ref 40–73)
PLATELET # BLD AUTO: 131 K/UL (ref 135–420)
PMV BLD AUTO: 10.8 FL (ref 9.2–11.8)
POTASSIUM SERPL-SCNC: 5.3 MMOL/L (ref 3.5–5.5)
PROT SERPL-MCNC: 5.8 G/DL (ref 6.4–8.2)
RBC # BLD AUTO: 4.29 M/UL (ref 4.2–5.3)
SODIUM SERPL-SCNC: 135 MMOL/L (ref 136–145)
WBC # BLD AUTO: 4.3 K/UL (ref 4.6–13.2)

## 2017-05-20 PROCEDURE — 74011250637 HC RX REV CODE- 250/637: Performed by: HOSPITALIST

## 2017-05-20 PROCEDURE — 85025 COMPLETE CBC W/AUTO DIFF WBC: CPT | Performed by: HOSPITALIST

## 2017-05-20 PROCEDURE — 82962 GLUCOSE BLOOD TEST: CPT

## 2017-05-20 PROCEDURE — 74011000250 HC RX REV CODE- 250: Performed by: FAMILY MEDICINE

## 2017-05-20 PROCEDURE — 36415 COLL VENOUS BLD VENIPUNCTURE: CPT | Performed by: HOSPITALIST

## 2017-05-20 PROCEDURE — 74011250637 HC RX REV CODE- 250/637: Performed by: FAMILY MEDICINE

## 2017-05-20 PROCEDURE — 74011250637 HC RX REV CODE- 250/637: Performed by: INTERNAL MEDICINE

## 2017-05-20 PROCEDURE — 74011250636 HC RX REV CODE- 250/636: Performed by: FAMILY MEDICINE

## 2017-05-20 PROCEDURE — C9113 INJ PANTOPRAZOLE SODIUM, VIA: HCPCS | Performed by: FAMILY MEDICINE

## 2017-05-20 PROCEDURE — 80053 COMPREHEN METABOLIC PANEL: CPT | Performed by: HOSPITALIST

## 2017-05-20 PROCEDURE — 74011250636 HC RX REV CODE- 250/636: Performed by: NURSE PRACTITIONER

## 2017-05-20 RX ORDER — ATORVASTATIN CALCIUM 80 MG/1
80 TABLET, FILM COATED ORAL
Qty: 30 TAB | Refills: 5 | Status: SHIPPED | OUTPATIENT
Start: 2017-05-20

## 2017-05-20 RX ORDER — ASPIRIN 325 MG
325 TABLET ORAL DAILY
Qty: 100 TAB | Refills: 5 | Status: SHIPPED | OUTPATIENT
Start: 2017-05-20

## 2017-05-20 RX ORDER — LIDOCAINE 50 MG/G
PATCH TOPICAL
Qty: 1 EACH | Refills: 0 | Status: SHIPPED | OUTPATIENT
Start: 2017-05-20

## 2017-05-20 RX ORDER — AMOXICILLIN 250 MG
2 CAPSULE ORAL
Qty: 60 TAB | Refills: 5 | Status: SHIPPED | OUTPATIENT
Start: 2017-05-20

## 2017-05-20 RX ADMIN — ENOXAPARIN SODIUM 30 MG: 40 INJECTION SUBCUTANEOUS at 05:33

## 2017-05-20 RX ADMIN — ACETAMINOPHEN 650 MG: 325 TABLET, FILM COATED ORAL at 05:33

## 2017-05-20 RX ADMIN — GABAPENTIN 400 MG: 400 CAPSULE ORAL at 10:38

## 2017-05-20 RX ADMIN — SEVELAMER CARBONATE 800 MG: 800 TABLET, FILM COATED ORAL at 10:39

## 2017-05-20 RX ADMIN — BRINZOLAMIDE 1 DROP: 10 SUSPENSION/ DROPS OPHTHALMIC at 09:00

## 2017-05-20 RX ADMIN — ASPIRIN 325 MG ORAL TABLET 325 MG: 325 PILL ORAL at 10:38

## 2017-05-20 RX ADMIN — BRIMONIDINE TARTRATE 1 DROP: 2 SOLUTION/ DROPS OPHTHALMIC at 09:00

## 2017-05-20 RX ADMIN — TIMOLOL MALEATE 1 DROP: 5 SOLUTION OPHTHALMIC at 09:00

## 2017-05-20 RX ADMIN — SODIUM CHLORIDE 40 MG: 9 INJECTION INTRAMUSCULAR; INTRAVENOUS; SUBCUTANEOUS at 10:38

## 2017-05-20 RX ADMIN — Medication 1 TABLET: at 10:38

## 2017-05-20 NOTE — PROGRESS NOTES
Received bedside verbal report from Sister P  Pt resting, no sign of distress. Call light within reach. TRANSFER - OUT REPORT:    Verbal report given to Rambo Coughlin LPN(name) on John Barrett  being transferred to [facility]) for routine progression of care       Report consisted of patients Situation, Background, Assessment and   Recommendations(SBAR). Information from the following report(s) SBAR and MAR was reviewed with the receiving nurse. Lines: removed prior to dismissal    Opportunity for questions and clarification was provided. Patient transported with:   Patients medications from home via [facility]      1350 I have reviewed discharge instructions with the patient and caregiver. The patient and caregiver verbalized understanding.   1420 discharged via cart accomp by [facility] transport

## 2017-05-20 NOTE — PROGRESS NOTES
Spoke with  Dr Lilian Peñaloza, pt can  Go to [facility] [place] today, he wants me to set transport up  For 2pm. [facility]  gaVE THEM 2PM TIME, PT  WAS ON WILL CALL. Notified faisal taylor  Supervisor at  [employer]. notifiedpt  And nurse candace. notified  pts dtr  Candie Dailey.

## 2017-05-20 NOTE — DISCHARGE SUMMARY
Discharge Summary    Patient: Naheed Mendoza               Sex: female          DOA: 5/14/2017         YOB: 1945      Age:  74 y.o.        LOS:  LOS: 5 days                Admit Date: 5/14/2017    Discharge Date: 5/20/2017    Admission Diagnoses: Encephalopathy    Discharge Diagnoses:    Problem List as of 5/20/2017  Date Reviewed: 10/31/2012          Codes Class Noted - Resolved    * (Principal)Acute ischemic stroke ([facility] ICD-10-CM: I63.9  ICD-9-CM: 434.91  5/16/2017 - Present        Encephalopathy ICD-10-CM: G93.40  ICD-9-CM: 348.30  5/15/2017 - Present        ESRD on dialysis [facility]) ICD-10-CM: N18.6, Z99.2  ICD-9-CM: 585.6, V45.11  1/14/2016 - Present        Chest pain ICD-10-CM: R07.9  ICD-9-CM: 786.50  1/14/2016 - Present        Abnormal EKG ICD-10-CM: R94.31  ICD-9-CM: 794.31  1/14/2016 - Present        Fibrosis, breast ICD-10-CM: N60.39  ICD-9-CM: 610.3  10/31/2012 - Present        Diabetes ([facility] ICD-10-CM: E11.9  ICD-9-CM: 250.00  Unknown - Present        HTN (hypertension) ICD-10-CM: I10  ICD-9-CM: 401.9  Unknown - Present        Glaucoma ICD-10-CM: H40.9  ICD-9-CM: 365.9  Unknown - Present        Kidney failure ICD-10-CM: N19  ICD-9-CM: 331  Unknown - Present        S/P breast biopsy, left ICD-10-CM: B39.670  ICD-9-CM: V45.89  9/15/2010 - Present              Discharge Condition:  Improved    Hospital Course: pt is a 74year old female who was admitted to the hospital at [facility] because of a new onset of an acute cva . the pt has a h/o esrd and is dialysed 3x per week . She also  Has a h/o glaucoma ,htn  and glaucoma and a neuropathy. She was brought to the er because of slurred speech and she had an mri of the head done showing the presence of  an acute cva  Involving the left insular cortex    She had  an mra done  showing no evidence of significant intracranial stenosis or occlusion the pt on admission was seen by teleneurology but she was not a candidate for tpa . the pt had a 2 d echo showing the presence of diastolic dysfunction . she had a nsr seen on ekg and telemetry . the  Pt was followed by renal and by neurology. She was continued to be dialysed 3 x per week . The pt 's speech was dysarthric and slowly imprioved with time . She was seen by pt and ot and her ability to walk was impaired . For this reason the pt was then sent to rehab and from there she will go home . ConsTreatment Team: Attending Provider: Wanda Oliveros MD; Consulting Provider: Jean Claude Vargas MD; Consulting Provider: Deana Gosselin, MD; Consulting Provider: Gold Tian MD; Utilization Review: [facility]; Care Manager: Antonio Mahan, NISA; Occupational Therapy Assistant: Viri Collazo; Charge Nurse: Yang De La Rosa RN; Physical Therapy Assistant: Melecio Brady PTA    Significant Diagnostic Studies:     Discharge Medications:     Current Discharge Medication List      START taking these medications    Details   atorvastatin (LIPITOR) 80 mg tablet Take 1 Tab by mouth nightly. Qty: 30 Tab, Refills: 5      aspirin (ASPIRIN) 325 mg tablet Take 1 Tab by mouth daily. Qty: 100 Tab, Refills: 5      b complex-vitamin c-folic acid 0.8 mg (NEPHRO-KATHYA) 0.8 mg tab tablet Take 1 Tab by mouth daily. Qty: 30 Tab, Refills: 5      lidocaine (LIDODERM) 5 % Apply patch to the affected area for 12 hours a day and remove for 12 hours a day. Qty: 1 Each, Refills: 0      senna-docusate (PERICOLACE) 8.6-50 mg per tablet Take 2 Tabs by mouth nightly. Qty: 60 Tab, Refills: 5         CONTINUE these medications which have NOT CHANGED    Details   gabapentin (NEURONTIN) 400 mg capsule Take 400 mg by mouth. Take 400 mg every morning and 800 mg at bedtime      brimonidine-timolol (COMBIGAN) 0.2-0.5 % Drop ophthalmic solution 1 Drop every twelve (12) hours. sevelamer carbonate (RENVELA) 800 mg Tab tab Take 800 mg by mouth three (3) times daily.       FOLIC ACID/VITAMIN B COMP W-C (RENAL CAPS PO) Take 1 Cap by mouth daily.          STOP taking these medications       lisinopril (PRINIVIL, ZESTRIL) 20 mg tablet Comments:   Reason for Stopping:         brinzolamide (AZOPT) 1 % ophthalmic suspension Comments:   Reason for Stopping:         rosuvastatin (CRESTOR) 10 mg tablet Comments:   Reason for Stopping:               Activity: astolerated    Diet: renal diet    Wound Care:none    Follow-up: with renal and pcp

## 2017-05-20 NOTE — PROGRESS NOTES
Care Management Interventions  PCP Verified by CM: Yes  Mode of Transport at Discharge: BLS  Transition of Care Consult (CM Consult): Discharge Planning  Discharge Durable Medical Equipment: No  Physical Therapy Consult: Yes  Occupational Therapy Consult: Yes  Speech Therapy Consult: Yes  Current Support Network:  Other (dtr lives with pt.)  Confirm Follow Up Transport: Family  Plan discussed with Pt/Family/Caregiver: Yes  Discharge Location  Discharge Placement: Skilled nursing facility

## 2017-05-20 NOTE — CONSULTS
Neurology progress Note          Admit Date: 5/14/2017  Length of Stay: 5  Primary Care: Nicole Kam MD   Principle Problem: Acute ischemic stroke [facility])     Assessment/ plan   1. Acute Stroke: in the left insular cortex with mild dysphasia. Keep  ASA daily for antiplatelet therapy. Atorvastatin 80 mg qd. Manage BP per stroke protocol. Avoid lowering BP during dialysis. Speech therapy and going to Rehab today. Placement is pending. 2. ESRD: on HD per renal.           History: Asha Shaw is a 73 yo AA female with PMH of DM, HTN, stroke, ESRD, and arthritis who presented to the ED with difficulty expressing herself. She feels fine today and has mild trouble bringing  out her words. No focal weakness. As her baseline, some leg weakness and use wheelchair at home. MRI Results (most recent):    Results from [place] encounter on 05/14/17   MRA NECK WO CONT   Narrative History: Slurred speech, facial droop, right leg weakness, CVA    PROCEDURE: 2-D and 3-D time-of-flight noncontrast MRA neck performed with MIP  reconstructions obtained. FINDINGS: The visualized mid and distal cervical portions of vertebral arteries  and proximal visualized intradural vertebral arteries appear diffusely small but  patent. No evidence of significant distal common carotid artery stenosis, not  fully included proximally. No evidence of significant proximal right ICA  stenosis. There is eccentric plaque proximal left ICA narrowing the lumen, with  approximate residual luminal diameter 1.4 mm compared to normal caliber cervical  ICA 3.5 mm representing 60% diameter stenosis by NASCET criteria. Impression IMPRESSION:    1. Somewhat limited evaluation due to noncontrast study. 2. Moderate appearing approximately 60% diameter stenosis proximal left ICA. Correlation to noninvasive carotid Doppler study recommended. 3. No other significant carotid or vertebral stenosis. Latest Hemoglobin A1C:  Lab Results   Component Value Date/Time    Hemoglobin A1c 5.9 05/15/2017 08:35 AM       Latest Cardiology Procedure:  Results for orders placed or performed during the hospital encounter of 05/14/17   EKG, 12 LEAD, INITIAL   Result Value Ref Range    Ventricular Rate 79 BPM    Atrial Rate 79 BPM    P-R Interval 162 ms    QRS Duration 90 ms    Q-T Interval 420 ms    QTC Calculation (Bezet) 481 ms    Calculated P Axis 62 degrees    Calculated R Axis 6 degrees    Calculated T Axis 25 degrees    Diagnosis       Normal sinus rhythm  Normal ECG  When compared with ECG of 15-JULIANE-2016 10:56,  Questionable change in QRS axis  Confirmed by Naren Pelaez ([id number]) on 5/16/2017 7:52:21 AM         Important Labs:  No results found for: FOL, RBCF  Lab Results   Component Value Date/Time    Cholesterol, total 98 05/15/2017 08:35 AM    HDL Cholesterol 60 05/15/2017 08:35 AM    LDL, calculated 26.4 05/15/2017 08:35 AM    VLDL, calculated 11.6 05/15/2017 08:35 AM    Triglyceride 58 05/15/2017 08:35 AM    CHOL/HDL Ratio 1.6 05/15/2017 08:35 AM     Lab Results   Component Value Date/Time    TSH 0.85 05/15/2017 08:35 AM    Triiodothyronine (T3), free 2.1 05/15/2017 08:35 AM    T4, Free 0.9 05/15/2017 08:35 AM           PMH:   Past Medical History:   Diagnosis Date    Anemia NEC     CAD (coronary artery disease)     Congestive heart failure, unspecified     Diabetes [facility] [address]     Dyslipidemia     ESRD (end stage renal disease) on dialysis ([facility] [address]     mwf, frearti in [facility], Dr. Naseem Luke.      GERD (gastroesophageal reflux disease)     Glaucoma     HTN (hypertension)     Kidney failure     LVH (left ventricular hypertrophy)     Neuropathy             FH:   Family History   Problem Relation Age of Onset    Hypertension Other     Heart Disease Other     Diabetes Other     Diabetes Other     Heart Disease Other     Hypertension Other         SH:   Social History     Social History    Marital status:      Spouse name: N/A    Number of children: N/A    Years of education: N/A     Social History Main Topics    Smoking status: Never Smoker    Smokeless tobacco: Never Used    Alcohol use No    Drug use: No    Sexual activity: Not Asked     Other Topics Concern    None     Social History Narrative          Vital Signs:   Visit Vitals    /73 (BP 1 Location: Left leg, BP Patient Position: Head of bed elevated (Comment degrees))    Pulse 68    Temp 98.1 °F (36.7 °C)    Resp 18    Wt 59.5 kg (131 lb 3.2 oz)    SpO2 98%    BMI 20.55 kg/m2      Neurological examination:    Appearance: In no acute distress, well developed, awake and cooperative   Cardiovascular:  AV fistula RUE    Mental status examination: Alert and oriented. No dysarthria and mild dysphasia. .   Cranial Nerves:     I: smell    II: visual fields    II: pupils Equal, round, reactive to light   III,VII: ptosis none   III,IV,VI: extraocular muscles  Full ROM   V: facial light touch sensation  normal   V,VII: corneal reflex     VII: facial muscle function  normal   VIII: hearing    IX: soft palate elevation     IX,X: gag reflex    XI: sternocleidomastoid strength    XII: tongue strength  normal        Motor exam: Station, gait:  deferred. No trouble lift up arms and able to briefly raise up her legs.  Sensory: Intact to LT bilaterally .        Coordination:  finger to nose testing is normal          Medications:      [x] REVIEWED  Current Facility-Administered Medications   Medication    lidocaine (LIDODERM) 5 % patch 2 Patch    B complex-vitamin C-folic acid (NEPHRO-KATHYA) 0.8 mg tab    ondansetron (ZOFRAN) injection 1 mg    aspirin (ASPIRIN) tablet 325 mg    atorvastatin (LIPITOR) tablet 80 mg    acetaminophen (TYLENOL) tablet 650 mg    acetaminophen (TYLENOL) suppository 650 mg    senna-docusate (PERICOLACE) 8.6-50 mg per tablet 2 Tab    pantoprazole (PROTONIX) 40 mg in sodium chloride 0.9 % 10 mL injection    albuterol (PROVENTIL VENTOLIN) nebulizer solution 2.5 mg    folic acid (FOLVITE) 1 mg, thiamine (B-1) 100 mg in 0.9% sodium chloride 50 mL ivpb    brinzolamide (AZOPT) 1 % ophthalmic suspension 1 Drop    brimonidine (ALPHAGAN) 0.2 % ophthalmic solution 1 Drop    sevelamer carbonate (RENVELA) tab 800 mg    0.9% sodium chloride infusion    albumin human 25% (BUMINATE) solution 12.5 g    timolol (TIMOPTIC) 0.5 % ophthalmic solution 1 Drop    enoxaparin (LOVENOX) injection 30 mg    gabapentin (NEURONTIN) capsule 400 mg     Data:      [x] REVIEWED  Recent Results (from the past 24 hour(s))   GLUCOSE, POC    Collection Time: 05/19/17  5:08 PM   Result Value Ref Range    Glucose (POC) 117 (H) 70 - 110 mg/dL   GLUCOSE, POC    Collection Time: 05/20/17 12:08 AM   Result Value Ref Range    Glucose (POC) 201 (H) 70 - 110 mg/dL   GLUCOSE, POC    Collection Time: 05/20/17  7:24 AM   Result Value Ref Range    Glucose (POC) 70 70 - 110 mg/dL   CBC WITH AUTOMATED DIFF    Collection Time: 05/20/17  8:05 AM   Result Value Ref Range    WBC 4.3 (L) 4.6 - 13.2 K/uL    RBC 4.29 4. 20 - 5.30 M/uL    HGB 11.4 (L) 12.0 - 16.0 g/dL    HCT 35.7 35.0 - 45.0 %    MCV 83.2 74.0 - 97.0 FL    MCH 26.6 24.0 - 34.0 PG    MCHC 31.9 31.0 - 37.0 g/dL    RDW 17.8 (H) 11.6 - 14.5 %    PLATELET 325 (L) 182 - 420 K/uL    MPV 10.8 9.2 - 11.8 FL    NEUTROPHILS 44 40 - 73 %    LYMPHOCYTES 30 21 - 52 %    MONOCYTES 15 (H) 3 - 10 %    EOSINOPHILS 10 (H) 0 - 5 %    BASOPHILS 1 0 - 2 %    ABS. NEUTROPHILS 1.9 1.8 - 8.0 K/UL    ABS. LYMPHOCYTES 1.3 0.9 - 3.6 K/UL    ABS. MONOCYTES 0.6 0.05 - 1.2 K/UL    ABS. EOSINOPHILS 0.4 0.0 - 0.4 K/UL    ABS.  BASOPHILS 0.0 0.0 - 0.06 K/UL    DF AUTOMATED     METABOLIC PANEL, COMPREHENSIVE    Collection Time: 05/20/17  8:05 AM   Result Value Ref Range    Sodium 135 (L) 136 - 145 mmol/L    Potassium 5.3 3.5 - 5.5 mmol/L    Chloride 98 (L) 100 - 108 mmol/L    CO2 31 21 - 32 mmol/L    Anion gap 6 3.0 - 18 mmol/L    Glucose 74 74 - 99 mg/dL    BUN 29 (H) 7.0 - 18 MG/DL    Creatinine 4.02 (H) 0.6 - 1.3 MG/DL    BUN/Creatinine ratio 7 (L) 12 - 20      GFR est AA 13 (L) >60 ml/min/1.73m2    GFR est non-AA 11 (L) >60 ml/min/1.73m2    Calcium 8.6 8.5 - 10.1 MG/DL    Bilirubin, total 0.5 0.2 - 1.0 MG/DL    ALT (SGPT) 20 13 - 56 U/L    AST (SGOT) 28 15 - 37 U/L    Alk.  phosphatase 123 (H) 45 - 117 U/L    Protein, total 5.8 (L) 6.4 - 8.2 g/dL    Albumin 2.6 (L) 3.4 - 5.0 g/dL    Globulin 3.2 2.0 - 4.0 g/dL    A-G Ratio 0.8 0.8 - 1.7     GLUCOSE, POC    Collection Time: 05/20/17 12:46 PM   Result Value Ref Range    Glucose (POC) 146 (H) 70 - 110 mg/dL     Problem List:   Principal Problem:    Acute ischemic stroke ([facility] [address] (5/16/2017)    Active Problems:    Encephalopathy (5/15/2017)  Devin Rucker MD

## 2017-05-20 NOTE — PROGRESS NOTES
Paged dr Avina Head  To  See if can transfer to [facility] today, waiting for his call back.

## 2017-05-20 NOTE — PROGRESS NOTES
Bed side received from Tim Smith, [facility] pt in bed awake eating dinner. Pt states that she feels much better with pain. Pt has lidocane patch on both knees for pan. Assesment     2000- pt medicated for pain with tylenol. Family arrived at bedside. 0720- Bedside and Verbal shift change report given to Cole Vicente. RN (oncoming nurse) by Lorena Mae RN (offgoing nurse). Report included the following information SBAR, Kardex, Intake/Output, MAR and Recent Results. Peter Kent

## 2017-07-11 ENCOUNTER — HOSPITAL ENCOUNTER (EMERGENCY)
Age: 72
Discharge: HOME | End: 2017-07-11
Attending: EMERGENCY MEDICINE
Payer: MEDICARE

## 2017-07-11 ENCOUNTER — APPOINTMENT (OUTPATIENT)
Dept: GENERAL RADIOLOGY | Age: 72
End: 2017-07-11
Attending: PHYSICIAN ASSISTANT
Payer: MEDICARE

## 2017-07-11 VITALS
RESPIRATION RATE: 16 BRPM | SYSTOLIC BLOOD PRESSURE: 171 MMHG | OXYGEN SATURATION: 98 % | HEART RATE: 100 BPM | DIASTOLIC BLOOD PRESSURE: 63 MMHG

## 2017-07-11 DIAGNOSIS — M79.605 PAIN IN BOTH LOWER EXTREMITIES: Primary | ICD-10-CM

## 2017-07-11 DIAGNOSIS — M79.604 PAIN IN BOTH LOWER EXTREMITIES: Primary | ICD-10-CM

## 2017-07-11 PROCEDURE — 99284 EMERGENCY DEPT VISIT MOD MDM: CPT

## 2017-07-11 PROCEDURE — 72170 X-RAY EXAM OF PELVIS: CPT

## 2017-07-11 NOTE — ED PROVIDER NOTES
Patient is a 74 y.o. female presenting with leg pain and buttocks pain. Leg Pain    Pertinent negatives include no back pain. Buttocks pain    Pertinent negatives include no back pain. Nica Shepherd is a 74 y.o. female transported via EMS with c/o b/l lower ext pain ongoing for the past 1 yr, no new fall. Was seen here for the same pain and also at [facility] for which  She had X ray hip, lower ext PVL with no acute finding. Presents today c/o L hip pain \"for the past several weeks\"  Denies of any fall since last ER visit. Past Medical History:   Diagnosis Date    Anemia NEC     CAD (coronary artery disease)     Congestive heart failure, unspecified     Diabetes ([facility] [address]     Dyslipidemia     ESRD (end stage renal disease) on dialysis ([facility])     [facility], [place] in [facility] [place], Dr. Sabrina Altman.  GERD (gastroesophageal reflux disease)     Glaucoma     HTN (hypertension)     Kidney failure     LVH (left ventricular hypertrophy)     Neuropathy ([facility])        Past Surgical History:   Procedure Laterality Date    HX BREAST BIOPSY  03/24/2010    excision left breast biopsy    HX HYSTERECTOMY      HX OTHER SURGICAL  12/3/10    I&D w/Pulsavac & wound vac application, left heel    HX OTHER SURGICAL  12/6/10    Debridement, left heel w/reduction of inferior calcaneal spur, left heel    HX POLYPECTOMY           Family History:   Problem Relation Age of Onset    Hypertension Other     Heart Disease Other     Diabetes Other     Diabetes Other     Heart Disease Other     Hypertension Other        Social History     Social History    Marital status:      Spouse name: N/A    Number of children: N/A    Years of education: N/A     Occupational History    Not on file.      Social History Main Topics    Smoking status: Never Smoker    Smokeless tobacco: Never Used    Alcohol use No    Drug use: No    Sexual activity: Not on file     Other Topics Concern    Not on file     Social History Narrative         ALLERGIES: Review of patient's allergies indicates no known allergies. Review of Systems   HENT: Negative for facial swelling. Eyes: Negative for pain. Respiratory: Negative for cough, chest tightness and shortness of breath. Cardiovascular: Negative for chest pain and palpitations. Gastrointestinal: Negative for abdominal pain, diarrhea, nausea and vomiting. Endocrine: Negative for polydipsia and polyphagia. Genitourinary: Negative for flank pain, hematuria, pelvic pain, urgency, vaginal bleeding and vaginal discharge. Musculoskeletal: Negative for back pain. Vitals:    07/11/17 1544   BP: 171/63   Pulse: 100   Resp: 16   SpO2: 98%            Physical Exam   Constitutional:   Lying in bed asleep with dysarthria from prior stroke. HENT:   Head: Normocephalic and atraumatic. Cardiovascular: Normal rate and regular rhythm. Pulmonary/Chest: Effort normal and breath sounds normal. No respiratory distress. She has no wheezes. She has no rales. Abdominal: Soft. Bowel sounds are normal. She exhibits no distension. There is no tenderness. There is no rebound and no guarding. Musculoskeletal:   Internal external rotation of L leg with c/o hip pain but asymptomatic on R hip. Examination on lower back around the LS  region with no open ulcer noted        MDM  Number of Diagnoses or Management Options  Pain in both lower extremities:   Diagnosis management comments: Pt had Duplex US at [facility] earlier this month negative for clot. X ray hip with no acute finding. This pain has been ongoing for over several months likely over a year   Will get Pelvic X ray and discharge if no acute finding.      X ray pelvic   \"Diffuse osteopenia limiting evaluation     Significant bilateral acetabula protrusio     Significant atherosclerosis.     Advanced osteoarthritic changes of both hips     No displaced fracture or subluxation seen \"    Will discharge. ED Course       Procedures        DISCHARGE NOTE:  6:12 PM    Grey Srinivasan's  results have been reviewed with her. She has been counseled regarding her diagnosis, treatment, and plan. She verbally conveys understanding and agreement of the signs, symptoms, diagnosis, treatment and prognosis and additionally agrees to follow up as discussed. She also agrees with the care-plan and conveys that all of her questions have been answered. I have also provided discharge instructions for her that include: educational information regarding their diagnosis and treatment, and list of reasons why they would want to return to the ED prior to their follow-up appointment, should her condition change. CLINICAL IMPRESSION:    1.  Pain in both lower extremities        AFTER VISIT PLAN:    Current Discharge Medication List           Follow-up Information     Follow up With Details Comments Contact Info    RODRIGO Sebastian MD Schedule an appointment as soon as possible for a visit in 2 days  [facility] [address] [id number] [phone]      [facility] EMERGENCY DEPT  If symptoms worsen [address]  [phone]           Written by Mohini Arreola PA-C

## 2020-02-26 NOTE — ROUTINE PROCESS
Bedside and Verbal shift change report given to Maria A Villegas (oncoming nurse) by Sunil Hsieh RN (offgoing nurse). Report included the following information SBAR, Kardex and MAR. No significant past surgical history

## 2021-01-08 NOTE — DISCHARGE INSTRUCTIONS
DISCHARGE SUMMARY from Nurse    The following personal items are in your possession at time of discharge:    Dental Appliances: With patient  Visual Aid: None     Home Medications: None  Jewelry: None  Clothing: Pajamas, Sweater, Footwear  Other Valuables: None             PATIENT INSTRUCTIONS:    After general anesthesia or intravenous sedation, for 24 hours or while taking prescription Narcotics:  · Limit your activities  · Do not drive and operate hazardous machinery  · Do not make important personal or business decisions  · Do  not drink alcoholic beverages  · If you have not urinated within 8 hours after discharge, please contact your surgeon on call. Report the following to your surgeon:  · Excessive pain, swelling, redness or odor of or around the surgical area  · Temperature over 100.5  · Nausea and vomiting lasting longer than 4 hours or if unable to take medications  · Any signs of decreased circulation or nerve impairment to extremity: change in color, persistent  numbness, tingling, coldness or increase pain  · Any questions        What to do at Home:  Recommended activity: PT/OT Eval and Treat,     If you experience any of the following symptoms that are listed in stroke education, please follow up with primary care doctor or emergency department. *  Please give a list of your current medications to your Primary Care Provider. *  Please update this list whenever your medications are discontinued, doses are      changed, or new medications (including over-the-counter products) are added. *  Please carry medication information at all times in case of emergency situations. These are general instructions for a healthy lifestyle:    No smoking/ No tobacco products/ Avoid exposure to second hand smoke    Surgeon General's Warning:  Quitting smoking now greatly reduces serious risk to your health.     Obesity, smoking, and sedentary lifestyle greatly increases your risk for illness    [facility] Initial Follow Up Call    Call within 2 business days of discharge: Yes     Patient: Aravind Weber Patient : 1946 MRN: [id number]    [unfilled]    RARS: Readmission Risk Score: 25       Spoke with: LEFT A MESSAGE FOR THE PATIENT TO CALL THE OFFICE. Discharge department/facility: D/C [facility] [address]    Non-face-to-face services provided:  Scheduled appointment with PCP-left a message for the patient to call the office.     Follow Up  Future Appointments   Date Time Provider Anabel Emerson   2/3/2021  1:00 PM Jody Travis MD [facility]   2021  1:00 PM PASCUAL Arreola - [facility], [address] [facility] healthy diet, regular physical exercise & weight monitoring are important for maintaining a healthy lifestyle    You may be retaining fluid if you have a history of heart failure or if you experience any of the following symptoms:  Weight gain of 3 pounds or more overnight or 5 pounds in a week, increased swelling in our hands or feet or shortness of breath while lying flat in bed. Please call your doctor as soon as you notice any of these symptoms; do not wait until your next office visit. Recognize signs and symptoms of STROKE:    F-face looks uneven    A-arms unable to move or move unevenly    S-speech slurred or non-existent    T-time-call 911 as soon as signs and symptoms begin-DO NOT go       Back to bed or wait to see if you get better-TIME IS BRAIN. Warning Signs of HEART ATTACK     Call 911 if you have these symptoms:   Chest discomfort. Most heart attacks involve discomfort in the center of the chest that lasts more than a few minutes, or that goes away and comes back. It can feel like uncomfortable pressure, squeezing, fullness, or pain.  Discomfort in other areas of the upper body. Symptoms can include pain or discomfort in one or both arms, the back, neck, jaw, or stomach.  Shortness of breath with or without chest discomfort.  Other signs may include breaking out in a cold sweat, nausea, or lightheadedness. Don't wait more than five minutes to call 911 - MINUTES MATTER! Fast action can save your life. Calling 911 is almost always the fastest way to get lifesaving treatment. Emergency Medical Services staff can begin treatment when they arrive -- up to an hour sooner than if someone gets to the hospital by car. The discharge information has been reviewed with the patient and caregiver. The patient and caregiver verbalized understanding.     Discharge medications reviewed with the patient and caregiver and appropriate educational materials and side effects teaching were provided. Learning About an Ischemic Stroke  What is an ischemic stroke? An ischemic (say \"dsy-QQD-woej\") stroke occurs when a blood clot blocks a blood vessel in the brain. This means that blood cannot flow to some part of the brain. Without blood and the oxygen it carries, this part of the brain starts to die. The part of the body controlled by the damaged area of the brain cannot work properly. This is different from a hemorrhagic (say \"rzz-uou-KZ-jick\") stroke, which happens when a blood vessel in the brain has burst open or has started to leak. The brain damage from a stroke starts within minutes. Quick treatment can help limit damage to the brain and make recovery more likely. People who have had a stroke may have a hard time talking, understanding things, and making decisions. They may have to relearn daily activities, such as how to eat, bathe, and dress. How well someone recovers from a stroke depends on how quickly the person gets to the hospital, where in the brain the stroke happened, and how severe it was. Training and therapy also make a difference in how well people recover. What are the symptoms? If you have any of these symptoms, call 911 or other emergency services right away:  · You have symptoms of a stroke. These may include:  ¨ Sudden numbness, tingling, weakness, or loss of movement in your face, arm, or leg, especially on only one side of your body. ¨ Sudden vision changes. ¨ Sudden trouble speaking. ¨ Sudden confusion or trouble understanding simple statements. ¨ Sudden problems with walking or balance. ¨ A sudden, severe headache that is different from past headaches. See your doctor if you have symptoms that seem like a stroke, even if they go away quickly. You may have had a transient ischemic attack (TIA), sometimes called a mini-stroke. A TIA is a warning that a stroke may happen soon. Getting early treatment for a TIA can help prevent a stroke.   What causes an ischemic stroke? An ischemic stroke is caused by a blood clot that blocks blood flow in the brain. The most common causes of blood clots include:  · Hardening of the arteries (atherosclerosis). This is caused by high blood pressure, diabetes, high cholesterol, or smoking. · Atrial fibrillation. How is ischemic stroke treated? You may have to take several medicines, depending on what caused your stroke. Ask your doctor if a stroke rehab program is right for you. Rehab increases your chances of getting back some of the abilities you lost.  Follow-up care is a key part of your treatment and safety. Be sure to make and go to all appointments, and call your doctor if you are having problems. It's also a good idea to know your test results and keep a list of the medicines you take. How can you prevent another stroke? · Work with your doctor to treat any health problems you have. High blood pressure, high cholesterol, atrial fibrillation, and diabetes all raise your chances of having a stroke. · Be safe with medicines. Take your medicine exactly as prescribed. Call your doctor if you think you are having a problem with your medicine. · Have a healthy lifestyle. ¨ Do not smoke or allow others to smoke around you. If you need help quitting, talk to your doctor about stop-smoking programs and medicines. These can increase your chances of quitting for good. Smoking makes a stroke more likely. ¨ Limit alcohol to 2 drinks a day for men and 1 drink a day for women. ¨ Lose weight if you need to. A healthy weight will help you keep your heart and body healthy. ¨ Be active. Ask your doctor what type and level of activity is safe for you. ¨ Eat heart-healthy foods, like fruits, vegetables, and high-fiber foods. Where can you learn more? Go to http://chandana-george.info/. Enter D161 in the search box to learn more about \"Learning About an Ischemic Stroke. \"  Current as of: June 4, 2016  Content Version: 11.2  © 0453-8001 [employer]. Care instructions adapted under license by [employer] (which disclaims liability or warranty for this information). If you have questions about a medical condition or this instruction, always ask your healthcare professional. [address] any warranty or liability for your use of this information. Learning About Emotional Changes After a Stroke  Introduction  After a stroke, many people feel different without knowing why. For example, some people find it hard to control their emotions. They may cry or laugh for no reason. Or they may feel down or even hopeless. Some people may find they're acting differently. They may act too quickly or on impulse. Or they may be more anxious and hesitant at times. If these changes happen to you, they can be upsetting. And they can be confusing to you and your loved ones. But these changes may get better with time as your brain heals. Let your loved ones know what's happening. Their support and understanding can help you deal with these feelings. And with time and support from the people around you, you can learn ways to adjust to life after a stroke. Follow-up care is a key part of your treatment and safety. Be sure to make and go to all appointments, and call your doctor if you are having problems. It's also a good idea to know your test results and keep a list of the medicines you take. How can a stroke affect your emotions? After a stroke, some people feel like they have lost control of their emotions. These feelings can come from one or both of these two causes:  · A stroke can affect parts of the brain that control how you feel. · A stroke can leave you with upsetting body changes that take away some of your independence. For example, some people may feel:  · Sad or angry about the loss of the lifestyle they had before. · Isolated by speech and language problems.   · Frustrated by the slow pace of recovery. · Worried about the future. These feelings are normal and expected. These strong feelings are more likely to happen if you need to stay in bed for long periods of time. And it is more likely to be a problem at night. It may help to have a radio playing softly in the bedroom or a dim light beside the bed. How can you deal with your emotions after a stroke? To deal with your emotions:  · Be easy on yourself. Let go of mistakes. · Give yourself credit for the progress you have made. · Make time for things that you enjoy. · Join a stroke support group. Your rehab team or local hospital can help you find one. Is depression common? It is common to feel sad about changes caused by the stroke. Sometimes the injury to the brain from the stroke can cause depression. If you think you might be depressed, tell your doctor right away. The sooner you know if you are depressed, the sooner you can get treatment. Treatment can help you feel better. Your doctor will want to know if in the past 2 weeks:  · You have lost interest or pleasure in doing things. · You have been feeling sad, hopeless, or empty. Your doctor may also ask about sleep troubles or changes in eating. How can friends and family help? Your loved ones can help you by following these tips:  · A person who has had a stroke may tend to have strong emotional reactions. Remember that these are a result of the stroke. Try not to become too upset by them. · Don't avoid a loved one who's had a stroke. Contact with and support from family members is very important to recovery. · Watch for signs of depression in people who have had a stroke. Urge them to talk to their doctor if they think they might be depressed. Where can you learn more? Go to http://chandana-george.info/. Enter 427 8091 in the search box to learn more about \"Learning About Emotional Changes After a Stroke. \"  Current as of: July 20, 2016  Content Version: 11.2  © 1425-2784 [employer]. Care instructions adapted under license by [employer] (which disclaims liability or warranty for this information). If you have questions about a medical condition or this instruction, always ask your healthcare professional. [address] any warranty or liability for your use of this information.